# Patient Record
Sex: MALE | Race: BLACK OR AFRICAN AMERICAN | Employment: UNEMPLOYED | URBAN - METROPOLITAN AREA
[De-identification: names, ages, dates, MRNs, and addresses within clinical notes are randomized per-mention and may not be internally consistent; named-entity substitution may affect disease eponyms.]

---

## 2018-04-18 ENCOUNTER — OFFICE VISIT (OUTPATIENT)
Dept: CARDIOLOGY CLINIC | Age: 33
End: 2018-04-18

## 2018-04-18 VITALS
BODY MASS INDEX: 23.4 KG/M2 | WEIGHT: 176.6 LBS | DIASTOLIC BLOOD PRESSURE: 69 MMHG | HEART RATE: 71 BPM | HEIGHT: 73 IN | SYSTOLIC BLOOD PRESSURE: 119 MMHG

## 2018-04-18 DIAGNOSIS — I49.01 VENTRICULAR FIBRILLATION (HCC): Primary | ICD-10-CM

## 2018-04-18 DIAGNOSIS — Z95.810 ICD (IMPLANTABLE CARDIOVERTER-DEFIBRILLATOR) IN PLACE: ICD-10-CM

## 2018-04-18 RX ORDER — SOTALOL HYDROCHLORIDE 80 MG/1
TABLET ORAL
COMMUNITY
End: 2018-05-25 | Stop reason: SDUPTHER

## 2018-04-18 NOTE — MR AVS SNAPSHOT
[address] [facility] 
 
 
 [address] [facility] 
[phone] Patient: Urvashi Woods MRN: [id number] MELINA:[birth year] Visit Information Date & Time Provider Department Dept. Phone Encounter #  
 4/18/2018 10:00 AM Mague Alonzo MD [facility] [phone] [id number] Follow-up Instructions Return in about 6 months (around 10/18/2018). Upcoming Health Maintenance Date Due DTaP/Tdap/Td series (1 - Tdap) 1/4/2006 Influenza Age 5 to Adult 8/1/2017 Allergies as of 4/18/2018  Review Complete On: 4/18/2018 By: Deb Gonzales Not on File Current Immunizations  Never Reviewed No immunizations on file. Not reviewed this visit You Were Diagnosed With   
  
 Codes Comments Ventricular fibrillation (HCC)    -  Primary ICD-10-CM: I49.01 
ICD-9-CM: 427.41   
 ICD (implantable cardioverter-defibrillator) in place     ICD-10-CM: Z95.810 ICD-9-CM: V45.02 Vitals BP Pulse Height(growth percentile) Weight(growth percentile) BMI Smoking Status 119/69 71 6' 1\" (1.854 m) 176 lb 9.6 oz (80.1 kg) 23.3 kg/m2 Former Smoker Vitals History BMI and BSA Data Body Mass Index Body Surface Area  
 23.3 kg/m 2 2.03 m 2 Your Updated Medication List  
  
   
This list is accurate as of 4/18/18 10:36 AM.  Always use your most recent med list.  
  
  
  
  
 sotalol 80 mg tablet Commonly known as:  Cuco Hood Take  by mouth. Take one tablet by mouth every 12 hours Follow-up Instructions Return in about 6 months (around 10/18/2018). Patient Instructions Atrial Fibrillation: Care Instructions Your Care Instructions Atrial fibrillation is an irregular and often fast heartbeat. Treating this condition is important for several reasons. It can cause blood clots, which can travel from your heart to your brain and cause a stroke.  If you have a fast heartbeat, you may feel lightheaded, dizzy, and weak. An irregular heartbeat can also increase your risk for heart failure. Atrial fibrillation is often the result of another heart condition, such as high blood pressure or coronary artery disease. Making changes to improve your heart condition will help you stay healthy and active. Follow-up care is a key part of your treatment and safety. Be sure to make and go to all appointments, and call your doctor if you are having problems. It's also a good idea to know your test results and keep a list of the medicines you take. How can you care for yourself at home? Medicines ? · Take your medicines exactly as prescribed. Call your doctor if you think you are having a problem with your medicine. You will get more details on the specific medicines your doctor prescribes. ? · If your doctor has given you a blood thinner to prevent a stroke, be sure you get instructions about how to take your medicine safely. Blood thinners can cause serious bleeding problems. ? · Do not take any vitamins, over-the-counter drugs, or herbal products without talking to your doctor first. ? Lifestyle changes ? · Do not smoke. Smoking can increase your chance of a stroke and heart attack. If you need help quitting, talk to your doctor about stop-smoking programs and medicines. These can increase your chances of quitting for good. ? · Eat a heart-healthy diet. ? · Stay at a healthy weight. Lose weight if you need to.  
? · Limit alcohol to 2 drinks a day for men and 1 drink a day for women. Too much alcohol can cause health problems. ? · Avoid colds and flu. Get a pneumococcal vaccine shot. If you have had one before, ask your doctor whether you need another dose. Get a flu shot every year. If you must be around people with colds or flu, wash your hands often. Activity ? · If your doctor recommends it, get more exercise.  Walking is a good choice. Bit by bit, increase the amount you walk every day. Try for at least 30 minutes on most days of the week. You also may want to swim, bike, or do other activities. Your doctor may suggest that you join a cardiac rehabilitation program so that you can have help increasing your physical activity safely. ? · Start light exercise if your doctor says it is okay. Even a small amount will help you get stronger, have more energy, and manage stress. Walking is an easy way to get exercise. Start out by walking a little more than you did in the hospital. Gradually increase the amount you walk. ? · When you exercise, watch for signs that your heart is working too hard. You are pushing too hard if you cannot talk while you are exercising. If you become short of breath or dizzy or have chest pain, sit down and rest immediately. ? · Check your pulse regularly. Place two fingers on the artery at the palm side of your wrist, in line with your thumb. If your heartbeat seems uneven or fast, talk to your doctor. When should you call for help? Call 911 anytime you think you may need emergency care. For example, call if: 
? · You have symptoms of a heart attack. These may include: ¨ Chest pain or pressure, or a strange feeling in the chest. 
¨ Sweating. ¨ Shortness of breath. ¨ Nausea or vomiting. ¨ Pain, pressure, or a strange feeling in the back, neck, jaw, or upper belly or in one or both shoulders or arms. ¨ Lightheadedness or sudden weakness. ¨ A fast or irregular heartbeat. After you call 911, the  may tell you to chew 1 adult-strength or 2 to 4 low-dose aspirin. Wait for an ambulance. Do not try to drive yourself. ? · You have symptoms of a stroke. These may include: 
¨ Sudden numbness, tingling, weakness, or loss of movement in your face, arm, or leg, especially on only one side of your body. ¨ Sudden vision changes. ¨ Sudden trouble speaking. ¨ Sudden confusion or trouble understanding simple statements. ¨ Sudden problems with walking or balance. ¨ A sudden, severe headache that is different from past headaches. ? · You passed out (lost consciousness). ?Call your doctor now or seek immediate medical care if: 
? · You have new or increased shortness of breath. ? · You feel dizzy or lightheaded, or you feel like you may faint. ? · Your heart rate becomes irregular. ? · You can feel your heart flutter in your chest or skip heartbeats. Tell your doctor if these symptoms are new or worse. ? Watch closely for changes in your health, and be sure to contact your doctor if you have any problems. Where can you learn more? Go to http://francisco-arik.info/. Enter U020 in the search box to learn more about \"Atrial Fibrillation: Care Instructions. \" Current as of: September 21, 2016 Content Version: 11.4 © 9251-7536 [employer]. Care instructions adapted under license by [employer] (which disclaims liability or warranty for this information). If you have questions about a medical condition or this instruction, always ask your healthcare professional. [place] [address] any warranty or liability for your use of this information. Introducing [facility]! [employer] introduces Polyplex patient portal. Now you can access parts of your medical record, email your doctor's office, and request medication refills online. 1. In your internet browser, go to [email or web address] 2. Click on the First Time User? Click Here link in the Sign In box. You will see the New Member Sign Up page. 3. Enter your Polyplex Access Code exactly as it appears below. You will not need to use this code after youve completed the sign-up process. If you do not sign up before the expiration date, you must request a new code. · Polyplex Access Code: [email or web address] Expires: 7/17/2018  9:41 AM 
 
4. Enter the last four digits of your Social Security Number (xxxx) and Date of Birth (mm/dd/yyyy) as indicated and click Submit. You will be taken to the next sign-up page. 5. Create a INBEP ID. This will be your INBEP login ID and cannot be changed, so think of one that is secure and easy to remember. 6. Create a INBEP password. You can change your password at any time. 7. Enter your Password Reset Question and Answer. This can be used at a later time if you forget your password. 8. Enter your e-mail address. You will receive e-mail notification when new information is available in [address] 9. Click Sign Up. You can now view and download portions of your medical record. 10. Click the Download Summary menu link to download a portable copy of your medical information. If you have questions, please visit the Frequently Asked Questions section of the INBEP website. Remember, INBEP is NOT to be used for urgent needs. For medical emergencies, dial 911. Now available from your iPhone and Android! Please provide this summary of care documentation to your next provider. If you have any questions after today's visit, please call [phone].

## 2018-04-18 NOTE — PATIENT INSTRUCTIONS
Atrial Fibrillation: Care Instructions  Your Care Instructions    Atrial fibrillation is an irregular and often fast heartbeat. Treating this condition is important for several reasons. It can cause blood clots, which can travel from your heart to your brain and cause a stroke. If you have a fast heartbeat, you may feel lightheaded, dizzy, and weak. An irregular heartbeat can also increase your risk for heart failure. Atrial fibrillation is often the result of another heart condition, such as high blood pressure or coronary artery disease. Making changes to improve your heart condition will help you stay healthy and active. Follow-up care is a key part of your treatment and safety. Be sure to make and go to all appointments, and call your doctor if you are having problems. It's also a good idea to know your test results and keep a list of the medicines you take. How can you care for yourself at home? Medicines  ? · Take your medicines exactly as prescribed. Call your doctor if you think you are having a problem with your medicine. You will get more details on the specific medicines your doctor prescribes. ? · If your doctor has given you a blood thinner to prevent a stroke, be sure you get instructions about how to take your medicine safely. Blood thinners can cause serious bleeding problems. ? · Do not take any vitamins, over-the-counter drugs, or herbal products without talking to your doctor first.   ? Lifestyle changes  ? · Do not smoke. Smoking can increase your chance of a stroke and heart attack. If you need help quitting, talk to your doctor about stop-smoking programs and medicines. These can increase your chances of quitting for good. ? · Eat a heart-healthy diet. ? · Stay at a healthy weight. Lose weight if you need to.   ? · Limit alcohol to 2 drinks a day for men and 1 drink a day for women. Too much alcohol can cause health problems. ? · Avoid colds and flu.  Get a pneumococcal vaccine shot. If you have had one before, ask your doctor whether you need another dose. Get a flu shot every year. If you must be around people with colds or flu, wash your hands often. Activity  ? · If your doctor recommends it, get more exercise. Walking is a good choice. Bit by bit, increase the amount you walk every day. Try for at least 30 minutes on most days of the week. You also may want to swim, bike, or do other activities. Your doctor may suggest that you join a cardiac rehabilitation program so that you can have help increasing your physical activity safely. ? · Start light exercise if your doctor says it is okay. Even a small amount will help you get stronger, have more energy, and manage stress. Walking is an easy way to get exercise. Start out by walking a little more than you did in the hospital. Gradually increase the amount you walk. ? · When you exercise, watch for signs that your heart is working too hard. You are pushing too hard if you cannot talk while you are exercising. If you become short of breath or dizzy or have chest pain, sit down and rest immediately. ? · Check your pulse regularly. Place two fingers on the artery at the palm side of your wrist, in line with your thumb. If your heartbeat seems uneven or fast, talk to your doctor. When should you call for help? Call 911 anytime you think you may need emergency care. For example, call if:  ? · You have symptoms of a heart attack. These may include:  ¨ Chest pain or pressure, or a strange feeling in the chest.  ¨ Sweating. ¨ Shortness of breath. ¨ Nausea or vomiting. ¨ Pain, pressure, or a strange feeling in the back, neck, jaw, or upper belly or in one or both shoulders or arms. ¨ Lightheadedness or sudden weakness. ¨ A fast or irregular heartbeat. After you call 911, the  may tell you to chew 1 adult-strength or 2 to 4 low-dose aspirin. Wait for an ambulance. Do not try to drive yourself.    ? · You have symptoms of a stroke. These may include:  ¨ Sudden numbness, tingling, weakness, or loss of movement in your face, arm, or leg, especially on only one side of your body. ¨ Sudden vision changes. ¨ Sudden trouble speaking. ¨ Sudden confusion or trouble understanding simple statements. ¨ Sudden problems with walking or balance. ¨ A sudden, severe headache that is different from past headaches. ? · You passed out (lost consciousness). ?Call your doctor now or seek immediate medical care if:  ? · You have new or increased shortness of breath. ? · You feel dizzy or lightheaded, or you feel like you may faint. ? · Your heart rate becomes irregular. ? · You can feel your heart flutter in your chest or skip heartbeats. Tell your doctor if these symptoms are new or worse. ? Watch closely for changes in your health, and be sure to contact your doctor if you have any problems. Where can you learn more? Go to http://francisco-arik.info/. Enter U020 in the search box to learn more about \"Atrial Fibrillation: Care Instructions. \"  Current as of: September 21, 2016  Content Version: 11.4  © 4607-1002 [employer]. Care instructions adapted under license by [employer] (which disclaims liability or warranty for this information). If you have questions about a medical condition or this instruction, always ask your healthcare professional. [address] any warranty or liability for your use of this information.

## 2018-04-18 NOTE — PROGRESS NOTES
HISTORY OF PRESENT ILLNESS  Ryan Nuno is a 35 y.o. male. New Patient   The history is provided by the patient. This is a chronic problem. The problem occurs every several days. The problem has not changed since onset. Pertinent negatives include no chest pain, no abdominal pain, no headaches and no shortness of breath. Dizziness   The history is provided by the patient. This is a chronic problem. The problem occurs every several days. The problem has not changed since onset. Pertinent negatives include no chest pain, no abdominal pain, no headaches and no shortness of breath. The symptoms are aggravated by standing. Review of Systems   Constitutional: Negative for chills, diaphoresis, fever, malaise/fatigue and weight loss. HENT: Negative for congestion, ear discharge, ear pain, hearing loss, nosebleeds and tinnitus. Eyes: Negative for blurred vision. Respiratory: Negative for cough, hemoptysis, sputum production, shortness of breath, wheezing and stridor. Cardiovascular: Negative for chest pain, palpitations, orthopnea, claudication, leg swelling and PND. Gastrointestinal: Negative for abdominal pain, heartburn, nausea and vomiting. Musculoskeletal: Negative for myalgias and neck pain. Skin: Negative for itching and rash. Neurological: Positive for dizziness. Negative for tingling, tremors, focal weakness, loss of consciousness, weakness and headaches. Psychiatric/Behavioral: Negative for depression and suicidal ideas.      Family History   Problem Relation Age of Onset    Heart Surgery Mother        Past Medical History:   Diagnosis Date    ICD (implantable cardioverter-defibrillator) in place        Past Surgical History:   Procedure Laterality Date    HX PACEMAKER         Social History   Substance Use Topics    Smoking status: Former Smoker     Types: Cigarettes     Quit date: 2/18/2018    Smokeless tobacco: Never Used    Alcohol use No       Not on File    Outpatient Prescriptions Marked as Taking for the 4/18/18 encounter (Office Visit) with Mague Alonzo MD   Medication Sig Dispense Refill    sotalol (BETAPACE) 80 mg tablet Take  by mouth. Take one tablet by mouth every 12 hours          Visit Vitals    /69    Pulse 71    Ht 6' 1\" (1.854 m)    Wt 80.1 kg (176 lb 9.6 oz)    BMI 23.3 kg/m2     Physical Exam   Constitutional: He is oriented to person, place, and time. He appears well-developed and well-nourished. No distress. HENT:   Head: Atraumatic. Mouth/Throat: No oropharyngeal exudate. Eyes: Conjunctivae are normal. Right eye exhibits no discharge. Left eye exhibits no discharge. No scleral icterus. Neck: Normal range of motion. Neck supple. No JVD present. No tracheal deviation present. No thyromegaly present. Cardiovascular: Normal rate and regular rhythm. Exam reveals no gallop. No murmur heard. Pulmonary/Chest: Effort normal and breath sounds normal. No stridor. He has no wheezes. He has no rales. ICD in the left chest wall   Abdominal: Soft. There is no tenderness. There is no rebound and no guarding. Musculoskeletal: Normal range of motion. He exhibits no edema or tenderness. Lymphadenopathy:     He has no cervical adenopathy. Neurological: He is alert and oriented to person, place, and time. He exhibits normal muscle tone. Skin: Skin is warm. He is not diaphoretic. Psychiatric: He has a normal mood and affect. His behavior is normal.     ekg sinus rhythm with no acute st-t changes ( has mild RBBB pattern ? ??? brugada )  Echo 2016:  NORMAL LEFT VENTRICULAR CAVITY SIZE AND SYSTOLIC FUNCTION WITH AN EJECTION FRACTION OF 65%. NORMAL DIASTOLIC FILLING PATTERN. MILDLY DILATED RIGHT VENTRICULAR SIZE. MILD MITRAL REGURGITATION. NO EVIDENCE OF AORTIC REGURGITATION. TRACE OF TRICUSPID REGURGITATION. NORMAL PULMONARY ARTERY PRESSURE OF 17 MMHG. NO EVIDENCE OF PULMONIC REGURGITATION.    NO PREVIOUS REPORT FOR COMPARISON. ASSESSMENT and PLAN    ICD-10-CM ICD-9-CM    1. Ventricular fibrillation (HCC) I49.01 427.41    2. ICD (implantable cardioverter-defibrillator) in place Z95.810 V45.02      No orders of the defined types were placed in this encounter. Follow-up Disposition:  Return in about 6 months (around 10/18/2018). current treatment plan is effective, no change in therapy. Patient with h/o Vfib s/p ICD -- had one episode of Vtach following implant-- was initially on amiodarone which was changed to sotalol 80mg bid. He unable to tolerate additional BB. No ICD shock since the last visit. Has occasional dizziness with no palpitations. Advised compliance with meds. Follow up in 6 months.

## 2018-05-25 DIAGNOSIS — I49.01 VENTRICULAR FIBRILLATION (HCC): Primary | ICD-10-CM

## 2018-05-25 RX ORDER — SOTALOL HYDROCHLORIDE 80 MG/1
80 TABLET ORAL 2 TIMES DAILY
Qty: 60 TAB | Refills: 3 | Status: SHIPPED | OUTPATIENT
Start: 2018-05-25 | End: 2018-11-28 | Stop reason: SDUPTHER

## 2018-05-25 NOTE — TELEPHONE ENCOUNTER
Requested Prescriptions     Pending Prescriptions Disp Refills    sotalol (BETAPACE) 80 mg tablet       Sig: Take  by mouth.  Take one tablet by mouth every 12 hours

## 2018-08-02 ENCOUNTER — OFFICE VISIT (OUTPATIENT)
Dept: CARDIOLOGY CLINIC | Age: 33
End: 2018-08-02

## 2018-08-02 VITALS
BODY MASS INDEX: 24.34 KG/M2 | DIASTOLIC BLOOD PRESSURE: 73 MMHG | SYSTOLIC BLOOD PRESSURE: 122 MMHG | WEIGHT: 170 LBS | HEIGHT: 70 IN | HEART RATE: 70 BPM

## 2018-08-02 DIAGNOSIS — R07.9 CHEST PAIN, UNSPECIFIED TYPE: ICD-10-CM

## 2018-08-02 DIAGNOSIS — R00.2 PALPITATION: ICD-10-CM

## 2018-08-02 DIAGNOSIS — Z95.810 ICD (IMPLANTABLE CARDIOVERTER-DEFIBRILLATOR) IN PLACE: ICD-10-CM

## 2018-08-02 DIAGNOSIS — I49.01 VENTRICULAR FIBRILLATION (HCC): Primary | ICD-10-CM

## 2018-08-02 RX ORDER — FAMOTIDINE 20 MG/1
20 TABLET, FILM COATED ORAL DAILY
COMMUNITY
End: 2019-07-17 | Stop reason: SDUPTHER

## 2018-08-02 NOTE — PROGRESS NOTES
HISTORY OF PRESENT ILLNESS Yo Amador is a 35 y.o. male. Chest Pain (Angina) Associated symptoms include dizziness and palpitations. Pertinent negatives include no abdominal pain, no claudication, no cough, no diaphoresis, no fever, no headaches, no hemoptysis, no malaise/fatigue, no nausea, no orthopnea, no PND, no shortness of breath, no sputum production, no vomiting and no weakness. Palpitations Associated symptoms include chest pain and dizziness. Pertinent negatives include no diaphoresis, no fever, no malaise/fatigue, no claudication, no orthopnea, no PND, no abdominal pain, no nausea, no vomiting, no headaches, no weakness, no cough, no hemoptysis, no shortness of breath and no sputum production. New Patient The history is provided by the patient. This is a chronic problem. The problem occurs every several days. The problem has not changed since onset. Associated symptoms include chest pain. Pertinent negatives include no abdominal pain, no headaches and no shortness of breath. Dizziness The history is provided by the patient. This is a chronic problem. The problem occurs every several days. The problem has not changed since onset. Associated symptoms include chest pain. Pertinent negatives include no abdominal pain, no headaches and no shortness of breath. The symptoms are aggravated by standing. Review of Systems Constitutional: Negative for chills, diaphoresis, fever, malaise/fatigue and weight loss. HENT: Negative for congestion, ear discharge, ear pain, hearing loss, nosebleeds and tinnitus. Eyes: Negative for blurred vision. Respiratory: Negative for cough, hemoptysis, sputum production, shortness of breath, wheezing and stridor. Cardiovascular: Positive for chest pain and palpitations. Negative for orthopnea, claudication, leg swelling and PND. Gastrointestinal: Negative for abdominal pain, heartburn, nausea and vomiting.   
Musculoskeletal: Negative for myalgias and neck pain. Skin: Negative for itching and rash. Neurological: Positive for dizziness. Negative for tingling, tremors, focal weakness, loss of consciousness, weakness and headaches. Psychiatric/Behavioral: Negative for depression and suicidal ideas. Family History Problem Relation Age of Onset  Heart Surgery Mother Past Medical History:  
Diagnosis Date  Chest pain 8/2/2018  
 atypical muscular or gerd  ICD (implantable cardioverter-defibrillator) in place  Palpitation 8/2/2018  
 recent compliant to sotalol qtc normal on er ekg Past Surgical History:  
Procedure Laterality Date  HX PACEMAKER Social History Substance Use Topics  Smoking status: Former Smoker Types: Cigarettes Quit date: 2/18/2018  Smokeless tobacco: Never Used  Alcohol use No  
 
 
Not on File Outpatient Prescriptions Marked as Taking for the 8/2/18 encounter (Office Visit) with Maurisio Patino MD  
Medication Sig Dispense Refill  famotidine (PEPCID) 20 mg tablet Take 20 mg by mouth daily.  sotalol (BETAPACE) 80 mg tablet Take 1 Tab by mouth two (2) times a day. Take one tablet by mouth every 12 hours 60 Tab 3 Visit Vitals  /73  Pulse 70  
 Ht 5' 10\" (1.778 m)  Wt 77.1 kg (170 lb)  BMI 24.39 kg/m2 Physical Exam  
Constitutional: He is oriented to person, place, and time. He appears well-developed and well-nourished. No distress. HENT:  
Head: Atraumatic. Mouth/Throat: No oropharyngeal exudate. Eyes: Conjunctivae are normal. Right eye exhibits no discharge. Left eye exhibits no discharge. No scleral icterus. Neck: Normal range of motion. Neck supple. No JVD present. No tracheal deviation present. No thyromegaly present. Cardiovascular: Normal rate and regular rhythm. Exam reveals no gallop. No murmur heard. Pulmonary/Chest: Effort normal and breath sounds normal. No stridor. He has no wheezes. He has no rales.   
ICD in the left chest wall Abdominal: Soft. There is no tenderness. There is no rebound and no guarding. Musculoskeletal: Normal range of motion. He exhibits no edema or tenderness. Lymphadenopathy:  
  He has no cervical adenopathy. Neurological: He is alert and oriented to person, place, and time. He exhibits normal muscle tone. Skin: Skin is warm. He is not diaphoretic. Psychiatric: He has a normal mood and affect. His behavior is normal.  
 
ekg sinus rhythm with no acute st-t changes ( has mild RBBB pattern ? ??? brugada ) Echo 2016: 
NORMAL LEFT VENTRICULAR CAVITY SIZE AND SYSTOLIC FUNCTION WITH AN EJECTION FRACTION OF 65%. NORMAL DIASTOLIC FILLING PATTERN. MILDLY DILATED RIGHT VENTRICULAR SIZE. MILD MITRAL REGURGITATION. NO EVIDENCE OF AORTIC REGURGITATION. TRACE OF TRICUSPID REGURGITATION. NORMAL PULMONARY ARTERY PRESSURE OF 17 MMHG. NO EVIDENCE OF PULMONIC REGURGITATION. NO PREVIOUS REPORT FOR COMPARISON. I have personally reviewed patient's records available from hospital and other providers and incorporated findings in patient care. Note,lab,ekg I Have personally reviewed recent relevant labs available and discussed with patient ASSESSMENT and PLAN 
  ICD-10-CM ICD-9-CM 1. Ventricular fibrillation ([address] I49.01 427.41   
 2016-no recent recurrence 2. ICD (implantable cardioverter-defibrillator) in place Z95.810 V45.02   
 scd 
needs check 3. Chest pain, unspecified type R07.9 786.50   
 atypical 
muscular or gerd 4. Palpitation R00.2 785.1   
 recent 
compliant to sotalol 
qtc normal on er ekg No orders of the defined types were placed in this encounter. Follow-up Disposition: 
Return in about 3 months (around 11/2/2018) for follow up with Dr Mar Wilcox, [address] [address] setup/transmission. current treatment plan is effective, no change in therapy.  
 
Patient with h/o Vfib s/p ICD -- had one episode of Vtach following implant-- was initially on amiodarone which was changed to sotalol 80mg bid. He unable to tolerate additional BB. No ICD shock since the last visit. Has occasional dizziness with no palpitations. Advised compliance with meds. Follow up in 6 months. 
8/2018 Emergency room visit yesterday. Records reviewed. Palpitation and noncardiac chest pain. Will interrogate ICD lab work was unremarkable. Compliant with sotalol.

## 2018-08-02 NOTE — LETTER
NOTIFICATION OF RETURN TO WORK / SCHOOL 
 
8/2/2018 12:27 PM 
 
Mr. Kassandra Lainez [address] [place] To Whom It May Concern: 
 
Kassandra Lainez was under the care of [address] from 8/2/18 to 8/2/18. He will be able to return to work/school on 8/3/18 with no restrictions. If there are questions or concerns please have the patient contact our office. Sincerely, Signed By: Vivek Gregory. Jenna Saldana MD  
 August 2, 2018 Odalys Ovalles MD

## 2018-08-02 NOTE — PROGRESS NOTES
1. Have you been to the ER, urgent care clinic since your last visit? Hospitalized since your last visit? Yes ortega 2. Have you seen or consulted any other health care providers outside of the [address] [place] [address] since your last visit? Include any pap smears or colon screening. No  
 
3. Since your last visit, have you had any of the following symptoms? chest pains and palpitations.

## 2018-08-02 NOTE — MR AVS SNAPSHOT
[facility] 
 
 
 [address] [facility] 
[phone] Patient: Khari Amaya MRN: [id number] PAQ:[id number] Visit Information Date & Time Provider Department Dept. Phone Encounter #  
 8/2/2018 12:00 PM Joby Malcolm MD [facility]  Follow-up Instructions Return in about 3 months (around 11/2/2018) for follow up with Dr Muna Dennis, [address] [address] setup/transmission. Your Appointments 10/17/2018 10:15 AM  
PROCEDURE with Charles Woo MD  
[facility] ([facility]) Appt Note: 6 month follow up with pacer check/REscour [address] [facility] [address]  
  
   
 [address] [facility]  
  
    
 11/7/2018 12:00 PM  
ESTABLISHED PATIENT with Charles Woo MD  
[facility] ([address] [place] [address]) Appt Note: 3 month [address] [place] [address] [address]  
  
   
 [address] [address] [address] [address] Upcoming Health Maintenance Date Due DTaP/Tdap/Td series (1 - Tdap) 1/4/2006 Influenza Age 5 to Adult 8/1/2018 Allergies as of 8/2/2018  Review Complete On: 8/2/2018 By: Joby Malcolm MD  
 Not on File Current Immunizations  Never Reviewed No immunizations on file. Not reviewed this visit You Were Diagnosed With   
  
 Codes Comments Ventricular fibrillation ([facility] [address]    -  Primary ICD-10-CM: I49.01 
ICD-9-CM: 427.41 2016-no recent recurrence ICD (implantable cardioverter-defibrillator) in place     ICD-10-CM: Z95.810 ICD-9-CM: V45.02 scd 
needs check Chest pain, unspecified type     ICD-10-CM: R07.9 ICD-9-CM: 786.50 atypical 
muscular or gerd Palpitation     ICD-10-CM: R00.2 ICD-9-CM: 785.1 recent 
compliant to sotalol 
qtc normal on er ekg Vitals  BP Pulse Height(growth percentile) Weight(growth percentile) BMI Smoking Status 122/73 70 5' 10\" (1.778 m) 170 lb (77.1 kg) 24.39 kg/m2 Former Smoker BMI and BSA Data Body Mass Index Body Surface Area  
 24.39 kg/m 2 1.95 m 2 Preferred Pharmacy Pharmacy Name Phone [address], [address] [facility] [address] Your Updated Medication List  
  
   
This list is accurate as of 8/2/18 12:29 PM.  Always use your most recent med list.  
  
  
  
  
 PEPCID 20 mg tablet Generic drug:  famotidine Take 20 mg by mouth daily. sotalol 80 mg tablet Commonly known as:  Britta Lora Take 1 Tab by mouth two (2) times a day. Take one tablet by mouth every 12 hours Follow-up Instructions Return in about 3 months (around 11/2/2018) for follow up with Dr Dee Gerber, [address] [address] setup/transmission. Introducing [facility]! [employer] introduces [employer] patient portal. Now you can access parts of your medical record, email your doctor's office, and request medication refills online. 1. In your internet browser, go to [email or web address] 2. Click on the First Time User? Click Here link in the Sign In box. You will see the New Member Sign Up page. 3. Enter your [employer] Access Code exactly as it appears below. You will not need to use this code after youve completed the sign-up process. If you do not sign up before the expiration date, you must request a new code. · [employer] Access Code: [email or web address] Expires: 10/31/2018 12:29 PM 
 
4. Enter the last four digits of your Social Security Number (xxxx) and Date of Birth (mm/dd/yyyy) as indicated and click Submit. You will be taken to the next sign-up page. 5. Create a [employer] ID. This will be your [employer] login ID and cannot be changed, so think of one that is secure and easy to remember. 6. Create a [employer] password. You can change your password at any time. 7. Enter your Password Reset Question and Answer.  This can be used at a later time if you forget your password. 8. Enter your e-mail address. You will receive e-mail notification when new information is available in [address] 9. Click Sign Up. You can now view and download portions of your medical record. 10. Click the Download Summary menu link to download a portable copy of your medical information. If you have questions, please visit the Frequently Asked Questions section of the [employer] website. Remember, [employer] is NOT to be used for urgent needs. For medical emergencies, dial 911. Now available from your iPhone and Android! Please provide this summary of care documentation to your next provider. If you have any questions after today's visit, please call [phone].

## 2018-08-30 ENCOUNTER — OFFICE VISIT (OUTPATIENT)
Dept: CARDIOLOGY CLINIC | Age: 33
End: 2018-08-30

## 2018-08-30 VITALS
WEIGHT: 168 LBS | BODY MASS INDEX: 24.05 KG/M2 | DIASTOLIC BLOOD PRESSURE: 81 MMHG | HEIGHT: 70 IN | HEART RATE: 107 BPM | SYSTOLIC BLOOD PRESSURE: 141 MMHG

## 2018-08-30 DIAGNOSIS — Z95.810 ICD (IMPLANTABLE CARDIOVERTER-DEFIBRILLATOR) IN PLACE: ICD-10-CM

## 2018-08-30 DIAGNOSIS — R42 DIZZINESS: Primary | ICD-10-CM

## 2018-08-30 DIAGNOSIS — I49.01 VENTRICULAR FIBRILLATION (HCC): ICD-10-CM

## 2018-08-30 NOTE — PATIENT INSTRUCTIONS
Dizziness: Care Instructions Your Care Instructions Dizziness is the feeling of unsteadiness or fuzziness in your head. It is different than having vertigo, which is a feeling that the room is spinning or that you are moving or falling. It is also different from lightheadedness, which is the feeling that you are about to faint. It can be hard to know what causes dizziness. Some people feel dizzy when they have migraine headaches. Sometimes bouts of flu can make you feel dizzy. Some medical conditions, such as heart problems or high blood pressure, can make you feel dizzy. Many medicines can cause dizziness, including medicines for high blood pressure, pain, or anxiety. If a medicine causes your symptoms, your doctor may recommend that you stop or change the medicine. If it is a problem with your heart, you may need medicine to help your heart work better. If there is no clear reason for your symptoms, your doctor may suggest watching and waiting for a while to see if the dizziness goes away on its own. Follow-up care is a key part of your treatment and safety. Be sure to make and go to all appointments, and call your doctor if you are having problems. It's also a good idea to know your test results and keep a list of the medicines you take. How can you care for yourself at home? · If your doctor recommends or prescribes medicine, take it exactly as directed. Call your doctor if you think you are having a problem with your medicine. · Do not drive while you feel dizzy. · Try to prevent falls. Steps you can take include: ¨ Using nonskid mats, adding grab bars near the tub, and using night-lights. ¨ Clearing your home so that walkways are free of anything you might trip on. ¨ Letting family and friends know that you have been feeling dizzy. This will help them know how to help you. When should you call for help? Call 911 anytime you think you may need emergency care. For example, call if:   · You passed out (lost consciousness).  
  · You have dizziness along with symptoms of a heart attack. These may include: ¨ Chest pain or pressure, or a strange feeling in the chest. 
¨ Sweating. ¨ Shortness of breath. ¨ Nausea or vomiting. ¨ Pain, pressure, or a strange feeling in the back, neck, jaw, or upper belly or in one or both shoulders or arms. ¨ Lightheadedness or sudden weakness. ¨ A fast or irregular heartbeat.  
  · You have symptoms of a stroke. These may include: 
¨ Sudden numbness, tingling, weakness, or loss of movement in your face, arm, or leg, especially on only one side of your body. ¨ Sudden vision changes. ¨ Sudden trouble speaking. ¨ Sudden confusion or trouble understanding simple statements. ¨ Sudden problems with walking or balance. ¨ A sudden, severe headache that is different from past headaches.  
 Call your doctor now or seek immediate medical care if: 
  · You feel dizzy and have a fever, headache, or ringing in your ears.  
  · You have new or increased nausea and vomiting.  
  · Your dizziness does not go away or comes back.  
 Watch closely for changes in your health, and be sure to contact your doctor if: 
  · You do not get better as expected. Where can you learn more? Go to http://francisco-arik.info/. Enter W931 in the search box to learn more about \"Dizziness: Care Instructions. \" Current as of: November 20, 2017 Content Version: 11.7 © 1662-9172 [employer]. Care instructions adapted under license by [employer] (which disclaims liability or warranty for this information). If you have questions about a medical condition or this instruction, always ask your healthcare professional. [place] [address] any warranty or liability for your use of this information.

## 2018-08-30 NOTE — LETTER
NOTIFICATION OF RETURN TO WORK / SCHOOL 
 
8/31/2018 11:27 AM 
 
Mr. Gricel Prabhakar [address] [place] To Whom It May Concern: 
 
Gricel  was under the care of [address] from 8/30/18 to 8/30/18. He will be able to return to work/school on 8/31/18 with no restrictions. If there are questions or concerns please have the patient contact our office. Sincerely, Signed By: Arina Hill MD  
 August 31, 2018 Arina Hill MD

## 2018-08-30 NOTE — PROGRESS NOTES
1. Have you been to the ER, urgent care clinic since your last visit? Hospitalized since your last visit? No 
2. Have you seen or consulted any other health care providers outside of the [address] [place] [address] since your last visit? Include any pap smears or colon screening. No  
 
3. Since your last visit, have you had any of the following symptoms?  
 
 dizziness.

## 2018-08-30 NOTE — MR AVS SNAPSHOT
[address] [facility], [place], operated by [facility] 
 
 
 [address] [facility] 
[phone] Patient: Maddy Membreno MRN: [id number] XKN:[id number] Visit Information Date & Time Provider Department Dept. Phone Encounter #  
 8/30/2018  1:00 PM Rip Diaz MD [facility] [id number] Follow-up Instructions Return in about 6 months (around 2/28/2019). Your Appointments 2/20/2019  8:45 AM  
Office Visit with Rip Diaz MD  
[facility] ([facility] Appt Note: 6 month follow up  
 [address] [facility] [address]  
  
   
 [address] [address] [address] [address] Upcoming Health Maintenance Date Due DTaP/Tdap/Td series (1 - Tdap) 1/4/2006 Influenza Age 5 to Adult 8/1/2018 Allergies as of 8/30/2018  Review Complete On: 8/30/2018 By: Lynsey Juarez Not on File Current Immunizations  Never Reviewed No immunizations on file. Not reviewed this visit You Were Diagnosed With   
  
 Codes Comments Dizziness    -  Primary ICD-10-CM: B96 ICD-9-CM: 780.4 Vitals BP Pulse Height(growth percentile) Weight(growth percentile) BMI Smoking Status 141/81 (BP 1 Location: Right arm, BP Patient Position: Standing) (!) 107 5' 10\" (1.778 m) 168 lb (76.2 kg) 24.11 kg/m2 Former Smoker Vitals History BMI and BSA Data Body Mass Index Body Surface Area  
 24.11 kg/m 2 1.94 m 2 Preferred Pharmacy Pharmacy Name Phone [address], [address] Your Updated Medication List  
  
   
This list is accurate as of 8/30/18  2:01 PM.  Always use your most recent med list.  
  
  
  
  
 PEPCID 20 mg tablet Generic drug:  famotidine Take 20 mg by mouth daily. sotalol 80 mg tablet Commonly known as:  Maria C Isaac  
 Take 1 Tab by mouth two (2) times a day. Take one tablet by mouth every 12 hours We Performed the Following AMB POC EKG ROUTINE W/ 12 LEADS, INTER & REP [00536 CPT(R)] Follow-up Instructions Return in about 6 months (around 2/28/2019). Patient Instructions Dizziness: Care Instructions Your Care Instructions Dizziness is the feeling of unsteadiness or fuzziness in your head. It is different than having vertigo, which is a feeling that the room is spinning or that you are moving or falling. It is also different from lightheadedness, which is the feeling that you are about to faint. It can be hard to know what causes dizziness. Some people feel dizzy when they have migraine headaches. Sometimes bouts of flu can make you feel dizzy. Some medical conditions, such as heart problems or high blood pressure, can make you feel dizzy. Many medicines can cause dizziness, including medicines for high blood pressure, pain, or anxiety. If a medicine causes your symptoms, your doctor may recommend that you stop or change the medicine. If it is a problem with your heart, you may need medicine to help your heart work better. If there is no clear reason for your symptoms, your doctor may suggest watching and waiting for a while to see if the dizziness goes away on its own. Follow-up care is a key part of your treatment and safety. Be sure to make and go to all appointments, and call your doctor if you are having problems. It's also a good idea to know your test results and keep a list of the medicines you take. How can you care for yourself at home? · If your doctor recommends or prescribes medicine, take it exactly as directed. Call your doctor if you think you are having a problem with your medicine. · Do not drive while you feel dizzy. · Try to prevent falls. Steps you can take include: ¨ Using nonskid mats, adding grab bars near the tub, and using night-lights. ¨ Clearing your home so that walkways are free of anything you might trip on. ¨ Letting family and friends know that you have been feeling dizzy. This will help them know how to help you. When should you call for help? Call 911 anytime you think you may need emergency care. For example, call if: 
  · You passed out (lost consciousness).  
  · You have dizziness along with symptoms of a heart attack. These may include: ¨ Chest pain or pressure, or a strange feeling in the chest. 
¨ Sweating. ¨ Shortness of breath. ¨ Nausea or vomiting. ¨ Pain, pressure, or a strange feeling in the back, neck, jaw, or upper belly or in one or both shoulders or arms. ¨ Lightheadedness or sudden weakness. ¨ A fast or irregular heartbeat.  
  · You have symptoms of a stroke. These may include: 
¨ Sudden numbness, tingling, weakness, or loss of movement in your face, arm, or leg, especially on only one side of your body. ¨ Sudden vision changes. ¨ Sudden trouble speaking. ¨ Sudden confusion or trouble understanding simple statements. ¨ Sudden problems with walking or balance. ¨ A sudden, severe headache that is different from past headaches.  
 Call your doctor now or seek immediate medical care if: 
  · You feel dizzy and have a fever, headache, or ringing in your ears.  
  · You have new or increased nausea and vomiting.  
  · Your dizziness does not go away or comes back.  
 Watch closely for changes in your health, and be sure to contact your doctor if: 
  · You do not get better as expected. Where can you learn more? Go to http://francisco-arik.info/. Enter F025 in the search box to learn more about \"Dizziness: Care Instructions. \" Current as of: November 20, 2017 Content Version: 11.7 © 7722-5156 [employer].  Care instructions adapted under license by [employer] (which disclaims liability or warranty for this information). If you have questions about a medical condition or this instruction, always ask your healthcare professional. [address] any warranty or liability for your use of this information. Introducing [facility] SERVICES! Pat Keven introduces iyzico patient portal. Now you can access parts of your medical record, email your doctor's office, and request medication refills online. 1. In your internet browser, go to [email or web address] 2. Click on the First Time User? Click Here link in the Sign In box. You will see the New Member Sign Up page. 3. Enter your iyzico Access Code exactly as it appears below. You will not need to use this code after youve completed the sign-up process. If you do not sign up before the expiration date, you must request a new code. · iyzico Access Code: [email or web address] Expires: 10/31/2018 12:29 PM 
 
4. Enter the last four digits of your Social Security Number (xxxx) and Date of Birth (mm/dd/yyyy) as indicated and click Submit. You will be taken to the next sign-up page. 5. Create a iyzico ID. This will be your iyzico login ID and cannot be changed, so think of one that is secure and easy to remember. 6. Create a iyzico password. You can change your password at any time. 7. Enter your Password Reset Question and Answer. This can be used at a later time if you forget your password. 8. Enter your e-mail address. You will receive e-mail notification when new information is available in [address] 9. Click Sign Up. You can now view and download portions of your medical record. 10. Click the Download Summary menu link to download a portable copy of your medical information. If you have questions, please visit the Frequently Asked Questions section of the iyzico website. Remember, iyzico is NOT to be used for urgent needs. For medical emergencies, dial 911. Now available from your iPhone and Android! Please provide this summary of care documentation to your next provider. If you have any questions after today's visit, please call [phone].

## 2018-08-31 NOTE — PROGRESS NOTES
HISTORY OF PRESENT ILLNESS Mark Hartman is a 35 y.o. male. Dizziness The history is provided by the patient. This is a chronic problem. The problem occurs every several days. The problem has not changed since onset. Pertinent negatives include no chest pain and no shortness of breath. The symptoms are aggravated by standing. Review of Systems Constitutional: Negative for chills, diaphoresis, fever, malaise/fatigue and weight loss. HENT: Negative for congestion, ear discharge, ear pain, hearing loss, nosebleeds and tinnitus. Eyes: Negative for blurred vision. Respiratory: Negative for cough, hemoptysis, sputum production, shortness of breath, wheezing and stridor. Cardiovascular: Negative for chest pain, palpitations, orthopnea, claudication, leg swelling and PND. Gastrointestinal: Negative for heartburn, nausea and vomiting. Musculoskeletal: Negative for myalgias and neck pain. Skin: Negative for itching and rash. Neurological: Positive for dizziness. Negative for tingling, tremors, focal weakness, loss of consciousness and weakness. Psychiatric/Behavioral: Negative for depression and suicidal ideas. Family History Problem Relation Age of Onset  Heart Surgery Mother Past Medical History:  
Diagnosis Date  Chest pain 8/2/2018  
 atypical muscular or gerd  ICD (implantable cardioverter-defibrillator) in place  Palpitation 8/2/2018  
 recent compliant to sotalol qtc normal on er ekg Past Surgical History:  
Procedure Laterality Date  HX PACEMAKER Social History Substance Use Topics  Smoking status: Former Smoker Types: Cigarettes Quit date: 2/18/2018  Smokeless tobacco: Never Used  Alcohol use No  
 
 
Not on File Outpatient Prescriptions Marked as Taking for the 8/30/18 encounter (Office Visit) with Pina Glaser MD  
Medication Sig Dispense Refill  famotidine (PEPCID) 20 mg tablet Take 20 mg by mouth daily.  sotalol (BETAPACE) 80 mg tablet Take 1 Tab by mouth two (2) times a day. Take one tablet by mouth every 12 hours 60 Tab 3 Visit Vitals  /81 (BP 1 Location: Right arm, BP Patient Position: Standing)  Pulse (!) 107  Ht 5' 10\" (1.778 m)  Wt 76.2 kg (168 lb)  BMI 24.11 kg/m2 Physical Exam  
Constitutional: He is oriented to person, place, and time. He appears well-developed and well-nourished. No distress. HENT:  
Head: Atraumatic. Mouth/Throat: No oropharyngeal exudate. Eyes: Conjunctivae are normal. Right eye exhibits no discharge. Left eye exhibits no discharge. No scleral icterus. Neck: Normal range of motion. Neck supple. No JVD present. No tracheal deviation present. No thyromegaly present. Cardiovascular: Normal rate and regular rhythm. Exam reveals no gallop. No murmur heard. Pulmonary/Chest: Effort normal and breath sounds normal. No stridor. He has no wheezes. He has no rales. ICD in the left chest wall Abdominal: Soft. There is no tenderness. There is no rebound and no guarding. Musculoskeletal: Normal range of motion. He exhibits no edema or tenderness. Lymphadenopathy:  
  He has no cervical adenopathy. Neurological: He is alert and oriented to person, place, and time. He exhibits normal muscle tone. Skin: Skin is warm. He is not diaphoretic. Psychiatric: He has a normal mood and affect. His behavior is normal.  
 
ekg sinus rhythm with no acute st-t changes ( has mild RBBB pattern ? ??? brugada ) Echo 2016: 
NORMAL LEFT VENTRICULAR CAVITY SIZE AND SYSTOLIC FUNCTION WITH AN EJECTION FRACTION OF 65%. NORMAL DIASTOLIC FILLING PATTERN. MILDLY DILATED RIGHT VENTRICULAR SIZE. MILD MITRAL REGURGITATION. NO EVIDENCE OF AORTIC REGURGITATION. TRACE OF TRICUSPID REGURGITATION. NORMAL PULMONARY ARTERY PRESSURE OF 17 MMHG. NO EVIDENCE OF PULMONIC REGURGITATION. NO PREVIOUS REPORT FOR COMPARISON. ASSESSMENT and PLAN 
  ICD-10-CM ICD-9-CM 1. Dizziness R42 780.4 AMB POC EKG ROUTINE W/ 12 LEADS, INTER & REP 2. Ventricular fibrillation (HCC) I49.01 427.41   
3. ICD (implantable cardioverter-defibrillator) in place Z95.810 V45.02 Orders Placed This Encounter  AMB POC EKG ROUTINE W/ 12 LEADS, INTER & REP Order Specific Question:   Reason for Exam: Answer:   dizziness Follow-up Disposition: 
Return in about 6 months (around 2/28/2019). current treatment plan is effective, no change in therapy. Patient with h/o Vfib s/p ICD -- had one episode of Vtach following implant-- was initially on amiodarone which was changed to sotalol 80mg bid. He unable to tolerate additional BB. No ICD shock since the last visit. Has occasional dizziness with no palpitations. Advised compliance with meds. Follow up in 6 months.

## 2018-11-28 DIAGNOSIS — I49.01 VENTRICULAR FIBRILLATION (HCC): ICD-10-CM

## 2018-11-28 RX ORDER — SOTALOL HYDROCHLORIDE 80 MG/1
80 TABLET ORAL 2 TIMES DAILY
Qty: 60 TAB | Refills: 3 | Status: SHIPPED | OUTPATIENT
Start: 2018-11-28 | End: 2019-07-17 | Stop reason: SDUPTHER

## 2018-11-28 NOTE — TELEPHONE ENCOUNTER
Requested Prescriptions     Pending Prescriptions Disp Refills    sotalol (BETAPACE) 80 mg tablet 60 Tab 3     Sig: Take 1 Tab by mouth two (2) times a day.  Take one tablet by mouth every 12 hours

## 2019-02-20 ENCOUNTER — OFFICE VISIT (OUTPATIENT)
Dept: CARDIOLOGY CLINIC | Age: 34
End: 2019-02-20

## 2019-02-20 VITALS
DIASTOLIC BLOOD PRESSURE: 71 MMHG | HEART RATE: 72 BPM | HEIGHT: 70 IN | BODY MASS INDEX: 25.91 KG/M2 | WEIGHT: 181 LBS | SYSTOLIC BLOOD PRESSURE: 124 MMHG

## 2019-02-20 DIAGNOSIS — I49.01 VENTRICULAR FIBRILLATION (HCC): ICD-10-CM

## 2019-02-20 DIAGNOSIS — Z95.810 ICD (IMPLANTABLE CARDIOVERTER-DEFIBRILLATOR) IN PLACE: Primary | ICD-10-CM

## 2019-02-20 RX ORDER — ALBUTEROL SULFATE 90 UG/1
AEROSOL, METERED RESPIRATORY (INHALATION)
COMMUNITY
End: 2020-09-02 | Stop reason: SDUPTHER

## 2019-02-20 NOTE — PROGRESS NOTES
1. Have you been to the ER, urgent care clinic since your last visit? Hospitalized since your last visit? Yes ortega    2. Have you seen or consulted any other health care providers outside of the [address] [place] [address] since your last visit? Include any pap smears or colon screening. No     3. Since your last visit, have you had any of the following symptoms? shortness of breath.

## 2019-02-20 NOTE — PROGRESS NOTES
HISTORY OF PRESENT ILLNESS  Carlos Tavera is a 29 y.o. male. Dizziness   The history is provided by the patient. This is a chronic problem. The problem occurs rarely. The problem has not changed since onset. Pertinent negatives include no chest pain and no shortness of breath. Review of Systems   Constitutional: Negative for chills, diaphoresis, malaise/fatigue and weight loss. HENT: Negative for congestion, ear discharge, hearing loss, nosebleeds and tinnitus. Eyes: Negative for blurred vision. Respiratory: Negative for shortness of breath and stridor. Cardiovascular: Negative for chest pain and palpitations. Gastrointestinal: Negative for heartburn and nausea. Musculoskeletal: Negative for myalgias. Skin: Negative for itching. Neurological: Positive for dizziness. Negative for tingling, tremors, focal weakness, loss of consciousness and weakness. Psychiatric/Behavioral: Negative for depression and suicidal ideas. Family History   Problem Relation Age of Onset    Heart Surgery Mother        Past Medical History:   Diagnosis Date    Chest pain 2018    atypical muscular or gerd     ICD (implantable cardioverter-defibrillator) in place     Palpitation 2018    recent compliant to sotalol qtc normal on er ekg       Past Surgical History:   Procedure Laterality Date    HX PACEMAKER         Social History     Tobacco Use    Smoking status: Former Smoker     Types: Cigarettes     Last attempt to quit: 2018     Years since quittin.0    Smokeless tobacco: Never Used   Substance Use Topics    Alcohol use: No       Not on File    Outpatient Medications Marked as Taking for the 19 encounter (Office Visit) with Lamont Marsh MD   Medication Sig Dispense Refill    albuterol (PROVENTIL HFA, VENTOLIN HFA, PROAIR HFA) 90 mcg/actuation inhaler Take  by inhalation.  sotalol (BETAPACE) 80 mg tablet Take 1 Tab by mouth two (2) times a day.  60 Tab 3    famotidine (PEPCID) 20 mg tablet Take 20 mg by mouth daily. Visit Vitals  /71   Pulse 72   Ht 5' 10\" (1.778 m)   Wt 82.1 kg (181 lb)   BMI 25.97 kg/m²     Physical Exam   Constitutional: He is oriented to person, place, and time. He appears well-developed and well-nourished. No distress. HENT:   Head: Atraumatic. Mouth/Throat: No oropharyngeal exudate. Eyes: Conjunctivae are normal. Right eye exhibits no discharge. Left eye exhibits no discharge. No scleral icterus. Neck: Normal range of motion. Neck supple. No JVD present. No tracheal deviation present. No thyromegaly present. Cardiovascular: Normal rate and regular rhythm. Exam reveals no gallop. No murmur heard. Pulmonary/Chest: Effort normal and breath sounds normal. No stridor. He has no wheezes. He has no rales. ICD in the left chest wall   Abdominal: Soft. There is no tenderness. There is no rebound and no guarding. Musculoskeletal: Normal range of motion. He exhibits no edema or tenderness. Lymphadenopathy:     He has no cervical adenopathy. Neurological: He is alert and oriented to person, place, and time. He exhibits normal muscle tone. Skin: Skin is warm. He is not diaphoretic. Psychiatric: He has a normal mood and affect. His behavior is normal.     ekg sinus rhythm with no acute st-t changes ( has mild RBBB pattern ? ??? brugada )  Echo 2016:  NORMAL LEFT VENTRICULAR CAVITY SIZE AND SYSTOLIC FUNCTION WITH AN EJECTION FRACTION OF 65%. NORMAL DIASTOLIC FILLING PATTERN. MILDLY DILATED RIGHT VENTRICULAR SIZE. MILD MITRAL REGURGITATION. NO EVIDENCE OF AORTIC REGURGITATION. TRACE OF TRICUSPID REGURGITATION. NORMAL PULMONARY ARTERY PRESSURE OF 17 MMHG. NO EVIDENCE OF PULMONIC REGURGITATION. NO PREVIOUS REPORT FOR COMPARISON. ASSESSMENT and PLAN    ICD-10-CM ICD-9-CM    1. ICD (implantable cardioverter-defibrillator) in place Z95.810 V45.02 AMB POC EKG ROUTINE W/ 12 LEADS, INTER & REP   2.  Ventricular fibrillation ([facility] [address] I49.01 427.41      Orders Placed This Encounter    AMB POC EKG ROUTINE W/ 12 LEADS, INTER & REP     Order Specific Question:   Reason for Exam:     Answer:   shortness of breath     Follow-up Disposition:  Return in about 6 months (around 8/20/2019). current treatment plan is effective, no change in therapy. Patient with h/o Vfib s/p ICD -- had one episode of Vtach following implant-- was initially on amiodarone which was changed to sotalol 80mg bid. He unable to tolerate additional BB. No ICD shock since the last visit. Has occasional dizziness with no palpitations. Advised compliance with meds. ekg today- revealed normal Qtc  Will obtain ICD interrogation  Follow up in 6 months.

## 2019-02-20 NOTE — PATIENT INSTRUCTIONS
[email or web address].me Activation    Thank you for requesting access to [email or web address]. Please follow the instructions below to securely access and download your online medical record. [email or web address] allows you to send messages to your doctor, view your test results, renew your prescriptions, schedule appointments, and more. How Do I Sign Up? 1. In your internet browser, go to [email or web address]. 2. Click on the First Time User? Click Here link in the Sign In box. You will see the New Member Sign Up page. 3. Enter your [email or web address].me Access Code exactly as it appears below. You will not need to use this code after youve completed the sign-up process. If you do not sign up before the expiration date, you must request a new code. [email or web address].me Access Code: [email or web address]  Expires: 2019  9:10 AM (This is the date your Fits.me access code will )    4. Enter the last four digits of your Social Security Number (xxxx) and Date of Birth (mm/dd/yyyy) as indicated and click Submit. You will be taken to the next sign-up page. 5. Create a Fits.me ID. This will be your Fits.me login ID and cannot be changed, so think of one that is secure and easy to remember. 6. Create a Fits.me password. You can change your password at any time. 7. Enter your Password Reset Question and Answer. This can be used at a later time if you forget your password. 8. Enter your e-mail address. You will receive e-mail notification when new information is available in [address]. 9. Click Sign Up. You can now view and download portions of your medical record. 10. Click the Download Summary menu link to download a portable copy of your medical information. Additional Information    If you have questions, please visit the Frequently Asked Questions section of the [email or web address] website at [email or web address] Remember, [email or web address] is NOT to be used for urgent needs. For medical emergencies, dial 911.

## 2019-07-17 ENCOUNTER — OFFICE VISIT (OUTPATIENT)
Dept: CARDIOLOGY CLINIC | Age: 34
End: 2019-07-17

## 2019-07-17 VITALS
BODY MASS INDEX: 25.97 KG/M2 | HEIGHT: 70 IN | DIASTOLIC BLOOD PRESSURE: 86 MMHG | HEART RATE: 80 BPM | SYSTOLIC BLOOD PRESSURE: 131 MMHG

## 2019-07-17 DIAGNOSIS — I49.01 VENTRICULAR FIBRILLATION (HCC): ICD-10-CM

## 2019-07-17 DIAGNOSIS — Z95.810 ICD (IMPLANTABLE CARDIOVERTER-DEFIBRILLATOR) IN PLACE: Primary | ICD-10-CM

## 2019-07-17 DIAGNOSIS — R42 DIZZINESS: ICD-10-CM

## 2019-07-17 RX ORDER — MECLIZINE HCL 12.5 MG 12.5 MG/1
12.5 TABLET ORAL
Qty: 20 TAB | Refills: 0 | Status: SHIPPED | OUTPATIENT
Start: 2019-07-17 | End: 2019-07-27

## 2019-07-17 RX ORDER — FAMOTIDINE 20 MG/1
20 TABLET, FILM COATED ORAL DAILY
Qty: 30 TAB | Refills: 6 | Status: SHIPPED | OUTPATIENT
Start: 2019-07-17 | End: 2020-09-02 | Stop reason: SDUPTHER

## 2019-07-17 RX ORDER — SOTALOL HYDROCHLORIDE 80 MG/1
80 TABLET ORAL 2 TIMES DAILY
Qty: 60 TAB | Refills: 6 | Status: SHIPPED | OUTPATIENT
Start: 2019-07-17 | End: 2019-08-14 | Stop reason: SDUPTHER

## 2019-07-17 NOTE — PATIENT INSTRUCTIONS
Dizziness: Care Instructions  Your Care Instructions  Dizziness is the feeling of unsteadiness or fuzziness in your head. It is different than having vertigo, which is a feeling that the room is spinning or that you are moving or falling. It is also different from lightheadedness, which is the feeling that you are about to faint. It can be hard to know what causes dizziness. Some people feel dizzy when they have migraine headaches. Sometimes bouts of flu can make you feel dizzy. Some medical conditions, such as heart problems or high blood pressure, can make you feel dizzy. Many medicines can cause dizziness, including medicines for high blood pressure, pain, or anxiety. If a medicine causes your symptoms, your doctor may recommend that you stop or change the medicine. If it is a problem with your heart, you may need medicine to help your heart work better. If there is no clear reason for your symptoms, your doctor may suggest watching and waiting for a while to see if the dizziness goes away on its own. Follow-up care is a key part of your treatment and safety. Be sure to make and go to all appointments, and call your doctor if you are having problems. It's also a good idea to know your test results and keep a list of the medicines you take. How can you care for yourself at home? · If your doctor recommends or prescribes medicine, take it exactly as directed. Call your doctor if you think you are having a problem with your medicine. · Do not drive while you feel dizzy. · Try to prevent falls. Steps you can take include:  ? Using nonskid mats, adding grab bars near the tub, and using night-lights. ? Clearing your home so that walkways are free of anything you might trip on.  ? Letting family and friends know that you have been feeling dizzy. This will help them know how to help you. When should you call for help? Call 911 anytime you think you may need emergency care.  For example, call if:    · You passed out (lost consciousness).     · You have dizziness along with symptoms of a heart attack. These may include:  ? Chest pain or pressure, or a strange feeling in the chest.  ? Sweating. ? Shortness of breath. ? Nausea or vomiting. ? Pain, pressure, or a strange feeling in the back, neck, jaw, or upper belly or in one or both shoulders or arms. ? Lightheadedness or sudden weakness. ? A fast or irregular heartbeat.     · You have symptoms of a stroke. These may include:  ? Sudden numbness, tingling, weakness, or loss of movement in your face, arm, or leg, especially on only one side of your body. ? Sudden vision changes. ? Sudden trouble speaking. ? Sudden confusion or trouble understanding simple statements. ? Sudden problems with walking or balance. ? A sudden, severe headache that is different from past headaches.    Call your doctor now or seek immediate medical care if:    · You feel dizzy and have a fever, headache, or ringing in your ears.     · You have new or increased nausea and vomiting.     · Your dizziness does not go away or comes back.    Watch closely for changes in your health, and be sure to contact your doctor if:    · You do not get better as expected. Where can you learn more? Go to http://francisco-arik.info/. Enter C817 in the search box to learn more about \"Dizziness: Care Instructions. \"  Current as of: September 23, 2018  Content Version: 11.9  © 8627-6574 [employer]. Care instructions adapted under license by [employer] (which disclaims liability or warranty for this information). If you have questions about a medical condition or this instruction, always ask your healthcare professional. [place] [address] any warranty or liability for your use of this information. MyChart Activation    Thank you for requesting access to [employer].  Please follow the instructions below to securely access and download your online medical record. [employer] allows you to send messages to your doctor, view your test results, renew your prescriptions, schedule appointments, and more. How Do I Sign Up? 1. In your internet browser, go to [email or web address]. 2. Click on the First Time User? Click Here link in the Sign In box. You will see the New Member Sign Up page. 3. Enter your [employer] Access Code exactly as it appears below. You will not need to use this code after youve completed the sign-up process. If you do not sign up before the expiration date, you must request a new code. [employer] Access Code: [email or web address]  Expires: 2019  1:42 PM (This is the date your [employer] access code will )    4. Enter the last four digits of your Social Security Number (xxxx) and Date of Birth (mm/dd/yyyy) as indicated and click Submit. You will be taken to the next sign-up page. 5. Create a [employer] ID. This will be your [employer] login ID and cannot be changed, so think of one that is secure and easy to remember. 6. Create a [employer] password. You can change your password at any time. 7. Enter your Password Reset Question and Answer. This can be used at a later time if you forget your password. 8. Enter your e-mail address. You will receive e-mail notification when new information is available in [address] 9. Click Sign Up. You can now view and download portions of your medical record. 10. Click the Download Summary menu link to download a portable copy of your medical information. Additional Information    If you have questions, please visit the Frequently Asked Questions section of the [employer] website at [email or web address] Remember, [employer] is NOT to be used for urgent needs. For medical emergencies, dial 911.

## 2019-07-17 NOTE — PROGRESS NOTES
HISTORY OF PRESENT ILLNESS  Vignesh Paulino is a 29 y.o. male. Dizziness   The history is provided by the patient. This is a chronic problem. The problem occurs rarely. The problem has not changed since onset. Pertinent negatives include no shortness of breath. Review of Systems   Constitutional: Negative for chills, diaphoresis, malaise/fatigue and weight loss. HENT: Negative for congestion, ear discharge, hearing loss, nosebleeds and tinnitus. Eyes: Negative for blurred vision. Respiratory: Negative for shortness of breath and stridor. Cardiovascular: Negative for palpitations. Gastrointestinal: Negative for heartburn and nausea. Musculoskeletal: Negative for myalgias. Skin: Negative for itching. Neurological: Positive for dizziness. Negative for tingling, tremors, focal weakness, loss of consciousness and weakness. Psychiatric/Behavioral: Negative for depression and suicidal ideas. Family History   Problem Relation Age of Onset    Heart Surgery Mother        Past Medical History:   Diagnosis Date    Chest pain 2018    atypical muscular or gerd     ICD (implantable cardioverter-defibrillator) in place     Palpitation 2018    recent compliant to sotalol qtc normal on er ekg       Past Surgical History:   Procedure Laterality Date    HX PACEMAKER         Social History     Tobacco Use    Smoking status: Former Smoker     Types: Cigarettes     Last attempt to quit: 2018     Years since quittin.4    Smokeless tobacco: Never Used   Substance Use Topics    Alcohol use: No       Not on File    Outpatient Medications Marked as Taking for the 19 encounter (Office Visit) with Adenike Collins MD   Medication Sig Dispense Refill    albuterol (PROVENTIL HFA, VENTOLIN HFA, PROAIR HFA) 90 mcg/actuation inhaler Take  by inhalation.  sotalol (BETAPACE) 80 mg tablet Take 1 Tab by mouth two (2) times a day.  60 Tab 3        Visit Vitals  /86 (BP 1 Location: Right arm, BP Patient Position: Standing)   Pulse 80   Ht 5' 10\" (1.778 m)   BMI 25.97 kg/m²     Physical Exam   Constitutional: He is oriented to person, place, and time. He appears well-developed and well-nourished. No distress. HENT:   Head: Atraumatic. Mouth/Throat: No oropharyngeal exudate. Eyes: Conjunctivae are normal. Right eye exhibits no discharge. Left eye exhibits no discharge. No scleral icterus. Neck: Normal range of motion. Neck supple. No JVD present. No tracheal deviation present. No thyromegaly present. Cardiovascular: Normal rate and regular rhythm. Exam reveals no gallop. No murmur heard. Pulmonary/Chest: Effort normal and breath sounds normal. No stridor. He has no wheezes. He has no rales. ICD in the left chest wall   Abdominal: Soft. There is no tenderness. There is no rebound and no guarding. Musculoskeletal: Normal range of motion. He exhibits no edema or tenderness. Lymphadenopathy:     He has no cervical adenopathy. Neurological: He is alert and oriented to person, place, and time. He exhibits normal muscle tone. Skin: Skin is warm. He is not diaphoretic. Psychiatric: He has a normal mood and affect. His behavior is normal.     ekg sinus rhythm with no acute st-t changes ( has mild RBBB pattern ? ??? brugada )  Echo 2016:  NORMAL LEFT VENTRICULAR CAVITY SIZE AND SYSTOLIC FUNCTION WITH AN EJECTION FRACTION OF 65%. NORMAL DIASTOLIC FILLING PATTERN. MILDLY DILATED RIGHT VENTRICULAR SIZE. MILD MITRAL REGURGITATION. NO EVIDENCE OF AORTIC REGURGITATION. TRACE OF TRICUSPID REGURGITATION. NORMAL PULMONARY ARTERY PRESSURE OF 17 MMHG. NO EVIDENCE OF PULMONIC REGURGITATION. NO PREVIOUS REPORT FOR COMPARISON. ASSESSMENT and PLAN    ICD-10-CM ICD-9-CM    1. ICD (implantable cardioverter-defibrillator) in place Z95.810 V45.02    2. Ventricular fibrillation (HCC) I49.01 427.41    3.  Dizziness R42 780.4      No orders of the defined types were placed in this encounter. current treatment plan is effective, no change in therapy. Patient with h/o Vfib s/p ICD -- had one episode of Vtach following implant-- was initially on amiodarone which was changed to sotalol 80mg bid. He unable to tolerate additional BB. Seen after recent ICD discharge. Appropriate shock in setting of ventricular tachycardia. Reports taking sotalol twice daily every day. QTc normal.  Will start antivert for dizziness.   F/u in 1 month

## 2019-07-17 NOTE — PROGRESS NOTES
1. Have you been to the ER, urgent care clinic since your last visit? Hospitalized since your last visit? Yes where: [facility]    2. Have you seen or consulted any other health care providers outside of the [address] [place] [place] [address] since your last visit? Include any pap smears or colon screening. No     3. Since your last visit, have you had any of the following symptoms? chest pains, palpitations, shortness of breath and dizziness. 4.  Have you had any blood work, X-rays or cardiac testing? Yes Where: [facility] Reason for visit: Labs         5. Where do you normally have your labs drawn? [facility]    6. Do you need any refills today?    No

## 2019-08-14 ENCOUNTER — OFFICE VISIT (OUTPATIENT)
Dept: CARDIOLOGY CLINIC | Age: 34
End: 2019-08-14

## 2019-08-14 VITALS
DIASTOLIC BLOOD PRESSURE: 73 MMHG | WEIGHT: 175.8 LBS | HEIGHT: 70 IN | HEART RATE: 60 BPM | SYSTOLIC BLOOD PRESSURE: 126 MMHG | BODY MASS INDEX: 25.17 KG/M2

## 2019-08-14 DIAGNOSIS — I49.01 VENTRICULAR FIBRILLATION (HCC): ICD-10-CM

## 2019-08-14 DIAGNOSIS — Z95.810 ICD (IMPLANTABLE CARDIOVERTER-DEFIBRILLATOR) IN PLACE: Primary | ICD-10-CM

## 2019-08-14 DIAGNOSIS — R42 DIZZINESS: ICD-10-CM

## 2019-08-14 RX ORDER — SOTALOL HYDROCHLORIDE 80 MG/1
80 TABLET ORAL 2 TIMES DAILY
Qty: 60 TAB | Refills: 3 | Status: SHIPPED | OUTPATIENT
Start: 2019-08-14 | End: 2019-12-26 | Stop reason: SDUPTHER

## 2019-08-14 NOTE — PROGRESS NOTES
HISTORY OF PRESENT ILLNESS  Danita Rosales is a 29 y.o. male. Dizziness   The history is provided by the patient. This is a chronic problem. The problem occurs every several days. The problem has been gradually improving. Pertinent negatives include no shortness of breath. Review of Systems   Constitutional: Negative for chills, diaphoresis, malaise/fatigue and weight loss. HENT: Negative for congestion, ear discharge, hearing loss, nosebleeds and tinnitus. Eyes: Negative for blurred vision. Respiratory: Negative for shortness of breath and stridor. Cardiovascular: Negative for palpitations. Gastrointestinal: Negative for heartburn and nausea. Musculoskeletal: Negative for myalgias. Skin: Negative for itching. Neurological: Positive for dizziness. Negative for tingling, tremors, focal weakness, loss of consciousness and weakness. Psychiatric/Behavioral: Negative for depression and suicidal ideas. Family History   Problem Relation Age of Onset    Heart Surgery Mother        Past Medical History:   Diagnosis Date    Chest pain 2018    atypical muscular or gerd     ICD (implantable cardioverter-defibrillator) in place     Palpitation 2018    recent compliant to sotalol qtc normal on er ekg       Past Surgical History:   Procedure Laterality Date    HX PACEMAKER         Social History     Tobacco Use    Smoking status: Former Smoker     Types: Cigarettes     Last attempt to quit: 2018     Years since quittin.4    Smokeless tobacco: Never Used   Substance Use Topics    Alcohol use: No       Not on File    Outpatient Medications Marked as Taking for the 19 encounter (Office Visit) with Meredith Coughlin MD   Medication Sig Dispense Refill    sotalol (BETAPACE) 80 mg tablet Take 1 Tab by mouth two (2) times a day. 60 Tab 3    famotidine (PEPCID) 20 mg tablet Take 1 Tab by mouth daily.  30 Tab 6    albuterol (PROVENTIL HFA, VENTOLIN HFA, PROAIR HFA) 90 mcg/actuation inhaler Take  by inhalation. Visit Vitals  /73   Pulse 60   Ht 5' 10\" (1.778 m)   Wt 79.7 kg (175 lb 12.8 oz)   BMI 25.22 kg/m²     Physical Exam   Constitutional: He is oriented to person, place, and time. He appears well-developed and well-nourished. No distress. HENT:   Head: Atraumatic. Mouth/Throat: No oropharyngeal exudate. Eyes: Conjunctivae are normal. Right eye exhibits no discharge. Left eye exhibits no discharge. No scleral icterus. Neck: Normal range of motion. Neck supple. No JVD present. No tracheal deviation present. No thyromegaly present. Cardiovascular: Normal rate and regular rhythm. Exam reveals no gallop. No murmur heard. Pulmonary/Chest: Effort normal and breath sounds normal. No stridor. He has no wheezes. He has no rales. ICD in the left chest wall   Abdominal: Soft. There is no tenderness. There is no rebound and no guarding. Musculoskeletal: Normal range of motion. He exhibits no edema or tenderness. Lymphadenopathy:     He has no cervical adenopathy. Neurological: He is alert and oriented to person, place, and time. He exhibits normal muscle tone. Skin: Skin is warm. He is not diaphoretic. Psychiatric: He has a normal mood and affect. His behavior is normal.     ekg sinus rhythm with no acute st-t changes ( has mild RBBB pattern ? ??? brugada )  Echo 2016:  NORMAL LEFT VENTRICULAR CAVITY SIZE AND SYSTOLIC FUNCTION WITH AN EJECTION FRACTION OF 65%. NORMAL DIASTOLIC FILLING PATTERN. MILDLY DILATED RIGHT VENTRICULAR SIZE. MILD MITRAL REGURGITATION. NO EVIDENCE OF AORTIC REGURGITATION. TRACE OF TRICUSPID REGURGITATION. NORMAL PULMONARY ARTERY PRESSURE OF 17 MMHG. NO EVIDENCE OF PULMONIC REGURGITATION. NO PREVIOUS REPORT FOR COMPARISON. ASSESSMENT and PLAN    ICD-10-CM ICD-9-CM    1. ICD (implantable cardioverter-defibrillator) in place Z95.810 V45.02    2.  Ventricular fibrillation (HCC) I49.01 427.41 sotalol (BETAPACE) 80 mg tablet      ECHO ADULT COMPLETE   3. Dizziness R42 780.4      Orders Placed This Encounter    sotalol (BETAPACE) 80 mg tablet     Sig: Take 1 Tab by mouth two (2) times a day. Dispense:  60 Tab     Refill:  3     Follow-up and Dispositions    · Return in about 6 months (around 2/14/2020). current treatment plan is effective, no change in therapy. Patient with h/o Vfib s/p ICD -- had one episode of Vtach following implant-- was initially on amiodarone which was changed to sotalol 80mg bid. He unable to tolerate additional BB. Seen after recent ICD discharge. Appropriate shock in setting of ventricular tachycardia. Reports taking sotalol twice daily every day. QTc normal.  No further ICD discharge. Dizziness mild-- recommend ENT evaluation.   F/u in 6 months

## 2019-08-14 NOTE — PROGRESS NOTES
1. Have you been to the ER, urgent care clinic since your last visit? Hospitalized since your last visit? No     2. Have you seen or consulted any other health care providers outside of the [address] [place] [address] since your last visit? Include any pap smears or colon screening. No     3. Since your last visit, have you had any of the following symptoms?      dizziness. 4.  Have you had any blood work, X-rays or cardiac testing?     }    5. Where do you normally have your labs drawn? PCP    6. Do you need any refills today?    No

## 2019-08-14 NOTE — PATIENT INSTRUCTIONS
Dizziness: Care Instructions  Your Care Instructions  Dizziness is the feeling of unsteadiness or fuzziness in your head. It is different than having vertigo, which is a feeling that the room is spinning or that you are moving or falling. It is also different from lightheadedness, which is the feeling that you are about to faint. It can be hard to know what causes dizziness. Some people feel dizzy when they have migraine headaches. Sometimes bouts of flu can make you feel dizzy. Some medical conditions, such as heart problems or high blood pressure, can make you feel dizzy. Many medicines can cause dizziness, including medicines for high blood pressure, pain, or anxiety. If a medicine causes your symptoms, your doctor may recommend that you stop or change the medicine. If it is a problem with your heart, you may need medicine to help your heart work better. If there is no clear reason for your symptoms, your doctor may suggest watching and waiting for a while to see if the dizziness goes away on its own. Follow-up care is a key part of your treatment and safety. Be sure to make and go to all appointments, and call your doctor if you are having problems. It's also a good idea to know your test results and keep a list of the medicines you take. How can you care for yourself at home? · If your doctor recommends or prescribes medicine, take it exactly as directed. Call your doctor if you think you are having a problem with your medicine. · Do not drive while you feel dizzy. · Try to prevent falls. Steps you can take include:  ? Using nonskid mats, adding grab bars near the tub, and using night-lights. ? Clearing your home so that walkways are free of anything you might trip on.  ? Letting family and friends know that you have been feeling dizzy. This will help them know how to help you. When should you call for help? Call 911 anytime you think you may need emergency care.  For example, call if:    · You passed out (lost consciousness).     · You have dizziness along with symptoms of a heart attack. These may include:  ? Chest pain or pressure, or a strange feeling in the chest.  ? Sweating. ? Shortness of breath. ? Nausea or vomiting. ? Pain, pressure, or a strange feeling in the back, neck, jaw, or upper belly or in one or both shoulders or arms. ? Lightheadedness or sudden weakness. ? A fast or irregular heartbeat.     · You have symptoms of a stroke. These may include:  ? Sudden numbness, tingling, weakness, or loss of movement in your face, arm, or leg, especially on only one side of your body. ? Sudden vision changes. ? Sudden trouble speaking. ? Sudden confusion or trouble understanding simple statements. ? Sudden problems with walking or balance. ? A sudden, severe headache that is different from past headaches.    Call your doctor now or seek immediate medical care if:    · You feel dizzy and have a fever, headache, or ringing in your ears.     · You have new or increased nausea and vomiting.     · Your dizziness does not go away or comes back.    Watch closely for changes in your health, and be sure to contact your doctor if:    · You do not get better as expected. Where can you learn more? Go to http://francisco-arik.info/. Enter C955 in the search box to learn more about \"Dizziness: Care Instructions. \"  Current as of: September 23, 2018  Content Version: 12.1  © 7288-8435 [employer]. Care instructions adapted under license by [employer] (which disclaims liability or warranty for this information). If you have questions about a medical condition or this instruction, always ask your healthcare professional. [place] [address] any warranty or liability for your use of this information. MyChart Activation    Thank you for requesting access to [employer].  Please follow the instructions below to securely access and download your online medical record. [employer] allows you to send messages to your doctor, view your test results, renew your prescriptions, schedule appointments, and more. How Do I Sign Up? 1. In your internet browser, go to [email or web address] Explain My Surgery/National Transcript Centerhart. 2. Click on the First Time User? Click Here link in the Sign In box. You will see the New Member Sign Up page. 3. Enter your [employer] Access Code exactly as it appears below. You will not need to use this code after youve completed the sign-up process. If you do not sign up before the expiration date, you must request a new code. [employer] Access Code: [email or web address]  Expires: 2019  1:42 PM (This is the date your [employer] access code will )    4. Enter the last four digits of your Social Security Number (xxxx) and Date of Birth (mm/dd/yyyy) as indicated and click Submit. You will be taken to the next sign-up page. 5. Create a [employer] ID. This will be your [employer] login ID and cannot be changed, so think of one that is secure and easy to remember. 6. Create a [employer] password. You can change your password at any time. 7. Enter your Password Reset Question and Answer. This can be used at a later time if you forget your password. 8. Enter your e-mail address. You will receive e-mail notification when new information is available in [address] 9. Click Sign Up. You can now view and download portions of your medical record. 10. Click the Download Summary menu link to download a portable copy of your medical information. Additional Information    If you have questions, please visit the Frequently Asked Questions section of the [employer] website at [email or web address] Explain My Surgery/National Transcript Centerhart/. Remember, [employer] is NOT to be used for urgent needs. For medical emergencies, dial 911.

## 2019-10-24 ENCOUNTER — TELEPHONE (OUTPATIENT)
Dept: CARDIOLOGY CLINIC | Age: 34
End: 2019-10-24

## 2019-10-24 NOTE — TELEPHONE ENCOUNTER
Called and spoke to patient per Dr. Jennifer Wynn okay to have surgery. He voices understanding and acceptance of this advice and will call back if any further questions or concerns.

## 2019-10-24 NOTE — TELEPHONE ENCOUNTER
Patient called and stated he will be having cataract surgery on Oct 31, 2019 and wants to know if the anesthesia will effect his heart.  Please advise

## 2019-12-26 DIAGNOSIS — I49.01 VENTRICULAR FIBRILLATION (HCC): ICD-10-CM

## 2019-12-26 RX ORDER — SOTALOL HYDROCHLORIDE 80 MG/1
80 TABLET ORAL 2 TIMES DAILY
Qty: 60 TAB | Refills: 3 | Status: SHIPPED | OUTPATIENT
Start: 2019-12-26 | End: 2020-05-26 | Stop reason: SDUPTHER

## 2019-12-31 ENCOUNTER — TELEPHONE (OUTPATIENT)
Dept: CARDIOLOGY CLINIC | Age: 34
End: 2019-12-31

## 2019-12-31 NOTE — TELEPHONE ENCOUNTER
Spoke with patient and advised that Dr. Melania Carter approved his medication Sotalol 80mg. A follow up visit is needed for any future refill requests. Patient expressed understanding.

## 2020-02-19 ENCOUNTER — OFFICE VISIT (OUTPATIENT)
Dept: CARDIOLOGY CLINIC | Age: 35
End: 2020-02-19

## 2020-02-19 VITALS
BODY MASS INDEX: 25.34 KG/M2 | HEIGHT: 70 IN | DIASTOLIC BLOOD PRESSURE: 76 MMHG | WEIGHT: 177 LBS | SYSTOLIC BLOOD PRESSURE: 134 MMHG | HEART RATE: 75 BPM

## 2020-02-19 DIAGNOSIS — Z95.810 ICD (IMPLANTABLE CARDIOVERTER-DEFIBRILLATOR) IN PLACE: ICD-10-CM

## 2020-02-19 DIAGNOSIS — I49.01 VENTRICULAR FIBRILLATION (HCC): Primary | ICD-10-CM

## 2020-02-19 DIAGNOSIS — I10 ESSENTIAL HYPERTENSION: ICD-10-CM

## 2020-02-19 NOTE — PROGRESS NOTES
1. Have you been to the ER, urgent care clinic since your last visit? Hospitalized since your last visit? Yes where: [facility]    2. Have you seen or consulted any other health care providers outside of the [address] [place] [address] since your last visit? Include any pap smears or colon screening. No     3. Since your last visit, have you had any of the following symptoms? .           4. Have you had any blood work, X-rays or cardiac testing? Yes Where: [facility] Reason for visit: Labs        5. Where do you normally have your labs drawn? PCP Office    6. Do you need any refills today?   No

## 2020-02-19 NOTE — PATIENT INSTRUCTIONS
High Blood Pressure: Care Instructions  Overview    It's normal for blood pressure to go up and down throughout the day. But if it stays up, you have high blood pressure. Another name for high blood pressure is hypertension. Despite what a lot of people think, high blood pressure usually doesn't cause headaches or make you feel dizzy or lightheaded. It usually has no symptoms. But it does increase your risk of stroke, heart attack, and other problems. You and your doctor will talk about your risks of these problems based on your blood pressure. Your doctor will give you a goal for your blood pressure. Your goal will be based on your health and your age. Lifestyle changes, such as eating healthy and being active, are always important to help lower blood pressure. You might also take medicine to reach your blood pressure goal.  Follow-up care is a key part of your treatment and safety. Be sure to make and go to all appointments, and call your doctor if you are having problems. It's also a good idea to know your test results and keep a list of the medicines you take. How can you care for yourself at home? Medical treatment  · If you stop taking your medicine, your blood pressure will go back up. You may take one or more types of medicine to lower your blood pressure. Be safe with medicines. Take your medicine exactly as prescribed. Call your doctor if you think you are having a problem with your medicine. · Talk to your doctor before you start taking aspirin every day. Aspirin can help certain people lower their risk of a heart attack or stroke. But taking aspirin isn't right for everyone, because it can cause serious bleeding. · See your doctor regularly. You may need to see the doctor more often at first or until your blood pressure comes down. · If you are taking blood pressure medicine, talk to your doctor before you take decongestants or anti-inflammatory medicine, such as ibuprofen.  Some of these medicines can raise blood pressure. · Learn how to check your blood pressure at home. Lifestyle changes  · Stay at a healthy weight. This is especially important if you put on weight around the waist. Losing even 10 pounds can help you lower your blood pressure. · If your doctor recommends it, get more exercise. Walking is a good choice. Bit by bit, increase the amount you walk every day. Try for at least 30 minutes on most days of the week. You also may want to swim, bike, or do other activities. · Avoid or limit alcohol. Talk to your doctor about whether you can drink any alcohol. · Try to limit how much sodium you eat to less than 2,300 milligrams (mg) a day. Your doctor may ask you to try to eat less than 1,500 mg a day. · Eat plenty of fruits (such as bananas and oranges), vegetables, legumes, whole grains, and low-fat dairy products. · Lower the amount of saturated fat in your diet. Saturated fat is found in animal products such as milk, cheese, and meat. Limiting these foods may help you lose weight and also lower your risk for heart disease. · Do not smoke. Smoking increases your risk for heart attack and stroke. If you need help quitting, talk to your doctor about stop-smoking programs and medicines. These can increase your chances of quitting for good. When should you call for help? Call  911 anytime you think you may need emergency care. This may mean having symptoms that suggest that your blood pressure is causing a serious heart or blood vessel problem. Your blood pressure may be over 180/120.   For example, call  911 if:    · You have symptoms of a heart attack. These may include:  ? Chest pain or pressure, or a strange feeling in the chest.  ? Sweating. ? Shortness of breath. ? Nausea or vomiting. ? Pain, pressure, or a strange feeling in the back, neck, jaw, or upper belly or in one or both shoulders or arms. ? Lightheadedness or sudden weakness.   ? A fast or irregular heartbeat.     · You have symptoms of a stroke. These may include:  ? Sudden numbness, tingling, weakness, or loss of movement in your face, arm, or leg, especially on only one side of your body. ? Sudden vision changes. ? Sudden trouble speaking. ? Sudden confusion or trouble understanding simple statements. ? Sudden problems with walking or balance. ? A sudden, severe headache that is different from past headaches.     · You have severe back or belly pain.    Do not wait until your blood pressure comes down on its own. Get help right away.   Call your doctor now or seek immediate care if:    · Your blood pressure is much higher than normal (such as 180/120 or higher), but you don't have symptoms.     · You think high blood pressure is causing symptoms, such as:  ? Severe headache.  ? Blurry vision.    Watch closely for changes in your health, and be sure to contact your doctor if:    · Your blood pressure measures higher than your doctor recommends at least 2 times. That means the top number is higher or the bottom number is higher, or both.     · You think you may be having side effects from your blood pressure medicine. Where can you learn more? Go to http://francisco-arik.info/. Enter C481 in the search box to learn more about \"High Blood Pressure: Care Instructions. \"  Current as of: April 9, 2019  Content Version: 12.2  © 1258-9663 Health[employer], Incorporated. Care instructions adapted under license by [employer] (which disclaims liability or warranty for this information). If you have questions about a medical condition or this instruction, always ask your healthcare professional. [address] any warranty or liability for your use of this information. [employer] Activation    Thank you for requesting access to [employer]. Please follow the instructions below to securely access and download your online medical record.  [employer] allows you to send messages to your doctor, view your test results, renew your prescriptions, schedule appointments, and more. How Do I Sign Up? 1. In your internet browser, go to [email or web address]. 2. Click on the First Time User? Click Here link in the Sign In box. You will see the New Member Sign Up page. 3. Enter your [employer] Access Code exactly as it appears below. You will not need to use this code after youve completed the sign-up process. If you do not sign up before the expiration date, you must request a new code. [employer] Access Code: [email or web address]  Expires: 2020 10:00 AM (This is the date your [employer] access code will )    4. Enter the last four digits of your Social Security Number (xxxx) and Date of Birth (mm/dd/yyyy) as indicated and click Submit. You will be taken to the next sign-up page. 5. Create a [employer] ID. This will be your [employer] login ID and cannot be changed, so think of one that is secure and easy to remember. 6. Create a [employer] password. You can change your password at any time. 7. Enter your Password Reset Question and Answer. This can be used at a later time if you forget your password. 8. Enter your e-mail address. You will receive e-mail notification when new information is available in [address] 9. Click Sign Up. You can now view and download portions of your medical record. 10. Click the Download Summary menu link to download a portable copy of your medical information. Additional Information    If you have questions, please visit the Frequently Asked Questions section of the [employer] website at [email or web address] Remember, [employer] is NOT to be used for urgent needs. For medical emergencies, dial 911.

## 2020-02-19 NOTE — PROGRESS NOTES
HISTORY OF PRESENT ILLNESS  Niecy Patten is a 28 y.o. male. Dizziness   The history is provided by the patient. This is a chronic problem. The problem occurs every several days. The problem has been gradually improving. Pertinent negatives include no shortness of breath. Review of Systems   Constitutional: Negative for chills, diaphoresis, malaise/fatigue and weight loss. HENT: Negative for congestion, ear discharge, hearing loss, nosebleeds and tinnitus. Eyes: Negative for blurred vision. Respiratory: Negative for shortness of breath and stridor. Cardiovascular: Negative for palpitations. Gastrointestinal: Negative for heartburn and nausea. Musculoskeletal: Negative for myalgias. Skin: Negative for itching. Neurological: Positive for dizziness. Negative for tingling, tremors, focal weakness, loss of consciousness and weakness. Psychiatric/Behavioral: Negative for depression and suicidal ideas. Family History   Problem Relation Age of Onset    Heart Surgery Mother        Past Medical History:   Diagnosis Date    Chest pain 2018    atypical muscular or gerd     ICD (implantable cardioverter-defibrillator) in place     Palpitation 2018    recent compliant to sotalol qtc normal on er ekg       Past Surgical History:   Procedure Laterality Date    HX PACEMAKER         Social History     Tobacco Use    Smoking status: Former Smoker     Types: Cigarettes     Last attempt to quit: 2018     Years since quittin.0    Smokeless tobacco: Never Used   Substance Use Topics    Alcohol use: No       Not on File    Outpatient Medications Marked as Taking for the 20 encounter (Office Visit) with Amada Perkins MD   Medication Sig Dispense Refill    sotalol (BETAPACE) 80 mg tablet Take 1 Tab by mouth two (2) times a day. 60 Tab 3    famotidine (PEPCID) 20 mg tablet Take 1 Tab by mouth daily.  30 Tab 6    albuterol (PROVENTIL HFA, VENTOLIN HFA, PROAIR HFA) 90 mcg/actuation inhaler Take  by inhalation. Visit Vitals  /76   Pulse 75   Ht 5' 10\" (1.778 m)   Wt 80.3 kg (177 lb)   BMI 25.40 kg/m²     Physical Exam   Constitutional: He is oriented to person, place, and time. He appears well-developed and well-nourished. No distress. HENT:   Head: Atraumatic. Mouth/Throat: No oropharyngeal exudate. Eyes: Conjunctivae are normal. Right eye exhibits no discharge. Left eye exhibits no discharge. No scleral icterus. Neck: Normal range of motion. Neck supple. No JVD present. No tracheal deviation present. No thyromegaly present. Cardiovascular: Normal rate and regular rhythm. Exam reveals no gallop. No murmur heard. Pulmonary/Chest: Effort normal and breath sounds normal. No stridor. He has no wheezes. He has no rales. ICD in the left chest wall   Abdominal: Soft. There is no abdominal tenderness. There is no rebound and no guarding. Musculoskeletal: Normal range of motion. General: No tenderness or edema. Lymphadenopathy:     He has no cervical adenopathy. Neurological: He is alert and oriented to person, place, and time. He exhibits normal muscle tone. Skin: Skin is warm. He is not diaphoretic. Psychiatric: He has a normal mood and affect. His behavior is normal.     ekg sinus rhythm with no acute st-t changes ( has mild RBBB pattern ? ??? brugada )  Echo 2016:  NORMAL LEFT VENTRICULAR CAVITY SIZE AND SYSTOLIC FUNCTION WITH AN EJECTION FRACTION OF 65%. NORMAL DIASTOLIC FILLING PATTERN. MILDLY DILATED RIGHT VENTRICULAR SIZE. MILD MITRAL REGURGITATION. NO EVIDENCE OF AORTIC REGURGITATION. TRACE OF TRICUSPID REGURGITATION. NORMAL PULMONARY ARTERY PRESSURE OF 17 MMHG. NO EVIDENCE OF PULMONIC REGURGITATION. NO PREVIOUS REPORT FOR COMPARISON.  01/20/20   ECHO ADULT COMPLETE 01/22/2020 1/22/2020    Narrative · Normal cavity size, wall thickness, systolic function (ejection fraction   normal) and diastolic function.  Estimated left ventricular ejection   fraction is 55 - 60%. No regional wall motion abnormality noted. · Normal right ventricular size and function. · No hemodynamically significant valvular pathology. Signed by: Ammon Watson MD     ASSESSMENT and PLAN    ICD-10-CM ICD-9-CM    1. Ventricular fibrillation (HCC) I49.01 427.41    2. Essential hypertension I10 401.9 AMB POC EKG ROUTINE W/ 12 LEADS, INTER & REP   3. ICD (implantable cardioverter-defibrillator) in place Z95.810 V45.02      Orders Placed This Encounter    AMB POC EKG ROUTINE W/ 12 LEADS, INTER & REP     Order Specific Question:   Reason for Exam:     Answer:   routine     Follow-up and Dispositions    · Return in about 6 months (around 8/19/2020). current treatment plan is effective, no change in therapy. Patient with h/o Vfib s/p ICD -- had one episode of Vtach following implant-- was initially on amiodarone which was changed to sotalol 80mg bid. He unable to tolerate additional BB. Had an episode of ICD discharge few months ago. No recurrence since the last appointment. Continues to be on sotalol. EKG reviewed has normal QTC. Continue current medications and follow-up in 6 months.

## 2020-03-25 ENCOUNTER — TELEPHONE (OUTPATIENT)
Dept: CARDIOLOGY CLINIC | Age: 35
End: 2020-03-25

## 2020-03-25 NOTE — TELEPHONE ENCOUNTER
Patient  Called and stated was in ER last night and they gave him Bactrim 800 mg. Can he take this medication with heart medicine.  Please advise

## 2020-03-25 NOTE — TELEPHONE ENCOUNTER
Spoke with patient per Dana Cowart NP, Per Shala there is no interaction between Sotalol and Bactrim. He voices understanding and acceptance of this advice and will call back if any further questions or concerns.

## 2020-05-26 DIAGNOSIS — I49.01 VENTRICULAR FIBRILLATION (HCC): ICD-10-CM

## 2020-05-26 RX ORDER — SOTALOL HYDROCHLORIDE 80 MG/1
80 TABLET ORAL 2 TIMES DAILY
Qty: 120 TAB | Refills: 2 | Status: SHIPPED | OUTPATIENT
Start: 2020-05-26 | End: 2020-07-21 | Stop reason: ALTCHOICE

## 2020-07-16 ENCOUNTER — TELEPHONE (OUTPATIENT)
Dept: CARDIOLOGY CLINIC | Age: 35
End: 2020-07-16

## 2020-07-16 NOTE — TELEPHONE ENCOUNTER
Called and spoke to patient per Dr. Yee Lopez, to see Dr. Juan Pablo montiel post ER for Vtach. He voices understanding and acceptance of this advice and will call back if any further questions or concerns.

## 2020-07-21 ENCOUNTER — OFFICE VISIT (OUTPATIENT)
Dept: CARDIOLOGY CLINIC | Age: 35
End: 2020-07-21

## 2020-07-21 VITALS
SYSTOLIC BLOOD PRESSURE: 141 MMHG | BODY MASS INDEX: 26.45 KG/M2 | WEIGHT: 184.8 LBS | TEMPERATURE: 98.7 F | OXYGEN SATURATION: 99 % | DIASTOLIC BLOOD PRESSURE: 68 MMHG | HEART RATE: 65 BPM | HEIGHT: 70 IN

## 2020-07-21 DIAGNOSIS — R42 DIZZINESS: ICD-10-CM

## 2020-07-21 DIAGNOSIS — I49.01 VENTRICULAR FIBRILLATION (HCC): Primary | ICD-10-CM

## 2020-07-21 DIAGNOSIS — I10 ESSENTIAL HYPERTENSION: ICD-10-CM

## 2020-07-21 DIAGNOSIS — Z95.810 ICD (IMPLANTABLE CARDIOVERTER-DEFIBRILLATOR) IN PLACE: ICD-10-CM

## 2020-07-21 RX ORDER — AMIODARONE HYDROCHLORIDE 200 MG/1
200 TABLET ORAL DAILY
Qty: 30 TAB | Refills: 6 | Status: SHIPPED | OUTPATIENT
Start: 2020-07-21 | End: 2020-10-23 | Stop reason: ALTCHOICE

## 2020-07-21 RX ORDER — AMIODARONE HYDROCHLORIDE 400 MG/1
400 TABLET ORAL 2 TIMES DAILY
Qty: 28 TAB | Refills: 0 | Status: SHIPPED | OUTPATIENT
Start: 2020-07-21 | End: 2020-10-23 | Stop reason: ALTCHOICE

## 2020-07-21 NOTE — PROGRESS NOTES
HISTORY OF PRESENT ILLNESS  Maria Alejandra Mathur is a 28 y.o. male. Dizziness   The history is provided by the patient. This is a chronic problem. The problem occurs every several days. The problem has been gradually improving. Pertinent negatives include no shortness of breath. ED Follow-up   The history is provided by the patient. This is a chronic problem. The problem occurs rarely. Pertinent negatives include no shortness of breath. Review of Systems   Constitutional: Negative for chills, diaphoresis, malaise/fatigue and weight loss. HENT: Negative for congestion, ear discharge, hearing loss, nosebleeds and tinnitus. Eyes: Negative for blurred vision. Respiratory: Negative for shortness of breath and stridor. Cardiovascular: Negative for palpitations. Gastrointestinal: Negative for heartburn and nausea. Musculoskeletal: Negative for myalgias. Skin: Negative for itching. Neurological: Positive for dizziness. Negative for tingling, tremors, focal weakness, loss of consciousness and weakness. Psychiatric/Behavioral: Negative for depression and suicidal ideas.      Family History   Problem Relation Age of Onset    Heart Surgery Mother        Past Medical History:   Diagnosis Date    Chest pain 2018    atypical muscular or gerd     ICD (implantable cardioverter-defibrillator) in place     Palpitation 2018    recent compliant to sotalol qtc normal on er ekg       Past Surgical History:   Procedure Laterality Date    HX PACEMAKER         Social History     Tobacco Use    Smoking status: Former Smoker     Types: Cigarettes     Last attempt to quit: 2018     Years since quittin.4    Smokeless tobacco: Never Used   Substance Use Topics    Alcohol use: No       No Known Allergies    Outpatient Medications Marked as Taking for the 20 encounter (Office Visit) with Margarito Farnsworth MD   Medication Sig Dispense Refill    amiodarone (PACERONE) 400 mg tablet Take 1 Tab by mouth two (2) times a day. 28 Tab 0    amiodarone (CORDARONE) 200 mg tablet Take 1 Tab by mouth daily. 30 Tab 6    famotidine (PEPCID) 20 mg tablet Take 1 Tab by mouth daily. 30 Tab 6    albuterol (PROVENTIL HFA, VENTOLIN HFA, PROAIR HFA) 90 mcg/actuation inhaler Take  by inhalation. Visit Vitals  /68 (BP 1 Location: Left arm, BP Patient Position: Sitting)   Pulse 65   Temp 98.7 °F (37.1 °C) (Temporal)   Ht 5' 10\" (1.778 m)   Wt 83.8 kg (184 lb 12.8 oz)   SpO2 99%   BMI 26.52 kg/m²     Physical Exam   Constitutional: He is oriented to person, place, and time. He appears well-developed and well-nourished. No distress. HENT:   Head: Atraumatic. Mouth/Throat: No oropharyngeal exudate. Eyes: Conjunctivae are normal. Right eye exhibits no discharge. Left eye exhibits no discharge. No scleral icterus. Neck: Normal range of motion. Neck supple. No JVD present. No tracheal deviation present. No thyromegaly present. Cardiovascular: Normal rate and regular rhythm. Exam reveals no gallop. No murmur heard. Pulmonary/Chest: Effort normal and breath sounds normal. No stridor. He has no wheezes. He has no rales. ICD in the left chest wall   Abdominal: Soft. There is no abdominal tenderness. There is no rebound and no guarding. Musculoskeletal: Normal range of motion. General: No tenderness or edema. Lymphadenopathy:     He has no cervical adenopathy. Neurological: He is alert and oriented to person, place, and time. He exhibits normal muscle tone. Skin: Skin is warm. He is not diaphoretic. Psychiatric: He has a normal mood and affect. His behavior is normal.     ekg sinus rhythm with no acute st-t changes ( has mild RBBB pattern ? ??? brugada )  Echo 2016:  NORMAL LEFT VENTRICULAR CAVITY SIZE AND SYSTOLIC FUNCTION WITH AN EJECTION FRACTION OF 65%. NORMAL DIASTOLIC FILLING PATTERN. MILDLY DILATED RIGHT VENTRICULAR SIZE. MILD MITRAL REGURGITATION.    NO EVIDENCE OF AORTIC REGURGITATION. TRACE OF TRICUSPID REGURGITATION. NORMAL PULMONARY ARTERY PRESSURE OF 17 MMHG. NO EVIDENCE OF PULMONIC REGURGITATION. NO PREVIOUS REPORT FOR COMPARISON.  01/20/20   ECHO ADULT COMPLETE 01/22/2020 1/22/2020    Narrative · Normal cavity size, wall thickness, systolic function (ejection fraction   normal) and diastolic function. Estimated left ventricular ejection   fraction is 55 - 60%. No regional wall motion abnormality noted. · Normal right ventricular size and function. · No hemodynamically significant valvular pathology. Signed by: Manasa Galarza MD     ASSESSMENT and PLAN    ICD-10-CM ICD-9-CM    1. Ventricular fibrillation (HCC)  I49.01 427.41 TSH 3RD GENERATION      PFT DLCO   2. Essential hypertension  I10 401.9    3. ICD (implantable cardioverter-defibrillator) in place  Z95.810 V45.02    4. Dizziness  R42 780.4      Orders Placed This Encounter    TSH 3RD GENERATION     Standing Status:   Future     Standing Expiration Date:   7/22/2021    PFT DLCO     Standing Status:   Future     Standing Expiration Date:   1/21/2021    amiodarone (PACERONE) 400 mg tablet     Sig: Take 1 Tab by mouth two (2) times a day. Dispense:  28 Tab     Refill:  0    amiodarone (CORDARONE) 200 mg tablet     Sig: Take 1 Tab by mouth daily. Dispense:  30 Tab     Refill:  6     Follow-up and Dispositions    · Return in about 1 month (around 8/21/2020). current treatment plan is effective, no change in therapy. Patient with h/o Vfib s/p ICD -- had one episode of Vtach following implant-- was initially on amiodarone which was changed to sotalol 80mg bid. He unable to tolerate additional BB. Was recently admitted to [facility] after an episode of syncope- ICD interrogation revealed VTach which spontaneously resolved-- takes sotalol 80 mg bid-- will discontinue sotalol and start amiodarone 400 mg bid for 2 weeks and then 200 mg daily.   All questions answered

## 2020-07-23 ENCOUNTER — TELEPHONE (OUTPATIENT)
Dept: CARDIOLOGY CLINIC | Age: 35
End: 2020-07-23

## 2020-07-23 NOTE — TELEPHONE ENCOUNTER
Patient called and stated he can not afford amiodarone medication. It will cost him 200 dollars. Please advise.

## 2020-08-21 NOTE — TELEPHONE ENCOUNTER
Forgot to call in one of his prescriptions: Needs adderall refilled. [facility] and message sent to pcp.     Reason for Disposition   Caller requesting a NON-URGENT new prescription or refill and triager unable to refill per unit policy    Protocols used: ST MEDICATION QUESTION CALL-A-AH       Requested Prescriptions     Pending Prescriptions Disp Refills    sotaloL (BETAPACE) 80 mg tablet 120 Tab 2     Sig: Take 1 Tab by mouth two (2) times a day.

## 2020-09-02 ENCOUNTER — OFFICE VISIT (OUTPATIENT)
Dept: CARDIOLOGY CLINIC | Age: 35
End: 2020-09-02

## 2020-09-02 VITALS
SYSTOLIC BLOOD PRESSURE: 125 MMHG | TEMPERATURE: 98 F | HEART RATE: 70 BPM | HEIGHT: 70 IN | BODY MASS INDEX: 26.34 KG/M2 | WEIGHT: 184 LBS | DIASTOLIC BLOOD PRESSURE: 78 MMHG

## 2020-09-02 DIAGNOSIS — I49.01 VENTRICULAR FIBRILLATION (HCC): Primary | ICD-10-CM

## 2020-09-02 DIAGNOSIS — Z95.810 ICD (IMPLANTABLE CARDIOVERTER-DEFIBRILLATOR) IN PLACE: ICD-10-CM

## 2020-09-02 DIAGNOSIS — R42 DIZZINESS: ICD-10-CM

## 2020-09-02 DIAGNOSIS — I10 ESSENTIAL HYPERTENSION: ICD-10-CM

## 2020-09-02 RX ORDER — ALBUTEROL SULFATE 90 UG/1
1 AEROSOL, METERED RESPIRATORY (INHALATION)
Qty: 1 INHALER | Refills: 0 | Status: SHIPPED | OUTPATIENT
Start: 2020-09-02 | End: 2021-05-05 | Stop reason: SDUPTHER

## 2020-09-02 RX ORDER — FAMOTIDINE 20 MG/1
20 TABLET, FILM COATED ORAL DAILY
Qty: 30 TAB | Refills: 0 | Status: SHIPPED | OUTPATIENT
Start: 2020-09-02 | End: 2020-10-23 | Stop reason: ALTCHOICE

## 2020-09-02 RX ORDER — SOTALOL HYDROCHLORIDE 80 MG/1
80 TABLET ORAL 2 TIMES DAILY
COMMUNITY
End: 2020-10-23 | Stop reason: SDUPTHER

## 2020-09-02 NOTE — PROGRESS NOTES
1. Have you been to the ER, urgent care clinic since your last visit? Hospitalized since your last visit?     no  2. Have you seen or consulted any other health care providers outside of the [address] [place] [address] since your last visit? Include any pap smears or colon screening. No     3. Since your last visit, have you had any of the following symptoms? shortness of breath and dizziness.

## 2020-09-02 NOTE — PROGRESS NOTES
HISTORY OF PRESENT ILLNESS  Yara Kemp is a 28 y.o. male. Dizziness   The history is provided by the patient. This is a chronic problem. The problem occurs every several days. The problem has been gradually improving. Review of Systems   Constitutional: Negative for chills, diaphoresis, malaise/fatigue and weight loss. HENT: Negative for congestion, ear discharge, hearing loss, nosebleeds and tinnitus. Eyes: Negative for blurred vision. Respiratory: Negative for stridor. Cardiovascular: Negative for palpitations. Gastrointestinal: Negative for heartburn and nausea. Musculoskeletal: Negative for myalgias. Skin: Negative for itching. Neurological: Positive for dizziness. Negative for tingling, tremors, focal weakness, loss of consciousness and weakness. Psychiatric/Behavioral: Negative for depression and suicidal ideas. Family History   Problem Relation Age of Onset    Heart Surgery Mother        Past Medical History:   Diagnosis Date    Chest pain 2018    atypical muscular or gerd     ICD (implantable cardioverter-defibrillator) in place     Palpitation 2018    recent compliant to sotalol qtc normal on er ekg       Past Surgical History:   Procedure Laterality Date    HX PACEMAKER         Social History     Tobacco Use    Smoking status: Former Smoker     Types: Cigarettes     Last attempt to quit: 2018     Years since quittin.5    Smokeless tobacco: Never Used   Substance Use Topics    Alcohol use: No       No Known Allergies    Outpatient Medications Marked as Taking for the 20 encounter (Office Visit) with Odalis Robles MD   Medication Sig Dispense Refill    famotidine (Pepcid) 20 mg tablet Take 1 Tab by mouth daily. 30 Tab 0    albuterol (PROVENTIL HFA, VENTOLIN HFA, PROAIR HFA) 90 mcg/actuation inhaler Take 1 Puff by inhalation every four (4) hours as needed for Wheezing.  1 Inhaler 0    sotaloL (BETAPACE) 80 mg tablet Take 80 mg by mouth two (2) times a day. Visit Vitals  /78   Pulse 70   Temp 98 °F (36.7 °C) (Temporal)   Ht 5' 10\" (1.778 m)   Wt 83.5 kg (184 lb)   BMI 26.40 kg/m²     Physical Exam   Constitutional: He is oriented to person, place, and time. He appears well-developed and well-nourished. No distress. HENT:   Head: Atraumatic. Mouth/Throat: No oropharyngeal exudate. Eyes: Conjunctivae are normal. Right eye exhibits no discharge. Left eye exhibits no discharge. No scleral icterus. Neck: Normal range of motion. Neck supple. No JVD present. No tracheal deviation present. No thyromegaly present. Cardiovascular: Normal rate and regular rhythm. Exam reveals no gallop. No murmur heard. Pulmonary/Chest: Effort normal and breath sounds normal. No stridor. He has no wheezes. He has no rales. ICD in the left chest wall   Abdominal: Soft. There is no abdominal tenderness. There is no rebound and no guarding. Musculoskeletal: Normal range of motion. General: No tenderness or edema. Lymphadenopathy:     He has no cervical adenopathy. Neurological: He is alert and oriented to person, place, and time. He exhibits normal muscle tone. Skin: Skin is warm. He is not diaphoretic. Psychiatric: He has a normal mood and affect. His behavior is normal.     ekg sinus rhythm with no acute st-t changes ( has mild RBBB pattern ? ??? brugada )  Echo 2016:  NORMAL LEFT VENTRICULAR CAVITY SIZE AND SYSTOLIC FUNCTION WITH AN EJECTION FRACTION OF 65%. NORMAL DIASTOLIC FILLING PATTERN. MILDLY DILATED RIGHT VENTRICULAR SIZE. MILD MITRAL REGURGITATION. NO EVIDENCE OF AORTIC REGURGITATION. TRACE OF TRICUSPID REGURGITATION. NORMAL PULMONARY ARTERY PRESSURE OF 17 MMHG. NO EVIDENCE OF PULMONIC REGURGITATION.    NO PREVIOUS REPORT FOR COMPARISON.  01/20/20   ECHO ADULT COMPLETE 01/22/2020 1/22/2020    Narrative · Normal cavity size, wall thickness, systolic function (ejection fraction   normal) and diastolic function. Estimated left ventricular ejection   fraction is 55 - 60%. No regional wall motion abnormality noted. · Normal right ventricular size and function. · No hemodynamically significant valvular pathology. Signed by: Chiara Ortez MD     ASSESSMENT and PLAN    ICD-10-CM ICD-9-CM    1. Ventricular fibrillation (HCC)  I49.01 427.41    2. ICD (implantable cardioverter-defibrillator) in place  Z95.810 V45.02    3. Essential hypertension  I10 401.9    4. Dizziness  R42 780.4      Orders Placed This Encounter    famotidine (Pepcid) 20 mg tablet     Sig: Take 1 Tab by mouth daily. Dispense:  30 Tab     Refill:  0    albuterol (PROVENTIL HFA, VENTOLIN HFA, PROAIR HFA) 90 mcg/actuation inhaler     Sig: Take 1 Puff by inhalation every four (4) hours as needed for Wheezing. Dispense:  1 Inhaler     Refill:  0     Follow-up and Dispositions    · Return in about 6 months (around 3/2/2021). current treatment plan is effective, no change in therapy. Patient with h/o Vfib s/p ICD -- had one episode of Vtach following implant-- was initially on amiodarone which was changed to sotalol 80mg bid. He unable to tolerate additional BB. Was recently admitted to [facility] after an episode of syncope- ICD interrogation revealed VTach which spontaneously resolved-- takes sotalol 80 mg bid--   Was prescribed amiodarone-- however not on it due to cost-- continues to take sotalol. Recent interrogation-- no further vtach.   F/u in 6 months

## 2020-09-02 NOTE — PATIENT INSTRUCTIONS
High Blood Pressure: Care Instructions Overview It's normal for blood pressure to go up and down throughout the day. But if it stays up, you have high blood pressure. Another name for high blood pressure is hypertension. Despite what a lot of people think, high blood pressure usually doesn't cause headaches or make you feel dizzy or lightheaded. It usually has no symptoms. But it does increase your risk of stroke, heart attack, and other problems. You and your doctor will talk about your risks of these problems based on your blood pressure. Your doctor will give you a goal for your blood pressure. Your goal will be based on your health and your age. Lifestyle changes, such as eating healthy and being active, are always important to help lower blood pressure. You might also take medicine to reach your blood pressure goal. 
Follow-up care is a key part of your treatment and safety. Be sure to make and go to all appointments, and call your doctor if you are having problems. It's also a good idea to know your test results and keep a list of the medicines you take. How can you care for yourself at home? Medical treatment · If you stop taking your medicine, your blood pressure will go back up. You may take one or more types of medicine to lower your blood pressure. Be safe with medicines. Take your medicine exactly as prescribed. Call your doctor if you think you are having a problem with your medicine. · Talk to your doctor before you start taking aspirin every day. Aspirin can help certain people lower their risk of a heart attack or stroke. But taking aspirin isn't right for everyone, because it can cause serious bleeding. · See your doctor regularly. You may need to see the doctor more often at first or until your blood pressure comes down. · If you are taking blood pressure medicine, talk to your doctor before you take decongestants or anti-inflammatory medicine, such as ibuprofen. Some of these medicines can raise blood pressure. · Learn how to check your blood pressure at home. Lifestyle changes · Stay at a healthy weight. This is especially important if you put on weight around the waist. Losing even 10 pounds can help you lower your blood pressure. · If your doctor recommends it, get more exercise. Walking is a good choice. Bit by bit, increase the amount you walk every day. Try for at least 30 minutes on most days of the week. You also may want to swim, bike, or do other activities. · Avoid or limit alcohol. Talk to your doctor about whether you can drink any alcohol. · Try to limit how much sodium you eat to less than 2,300 milligrams (mg) a day. Your doctor may ask you to try to eat less than 1,500 mg a day. · Eat plenty of fruits (such as bananas and oranges), vegetables, legumes, whole grains, and low-fat dairy products. · Lower the amount of saturated fat in your diet. Saturated fat is found in animal products such as milk, cheese, and meat. Limiting these foods may help you lose weight and also lower your risk for heart disease. · Do not smoke. Smoking increases your risk for heart attack and stroke. If you need help quitting, talk to your doctor about stop-smoking programs and medicines. These can increase your chances of quitting for good. When should you call for help? Call  911 anytime you think you may need emergency care. This may mean having symptoms that suggest that your blood pressure is causing a serious heart or blood vessel problem. Your blood pressure may be over 180/120. For example, call 911 if: 
· You have symptoms of a heart attack. These may include: 
? Chest pain or pressure, or a strange feeling in the chest. 
? Sweating. ? Shortness of breath. ? Nausea or vomiting. ? Pain, pressure, or a strange feeling in the back, neck, jaw, or upper belly or in one or both shoulders or arms. ? Lightheadedness or sudden weakness. ? A fast or irregular heartbeat. · You have symptoms of a stroke. These may include: 
? Sudden numbness, tingling, weakness, or loss of movement in your face, arm, or leg, especially on only one side of your body. ? Sudden vision changes. ? Sudden trouble speaking. ? Sudden confusion or trouble understanding simple statements. ? Sudden problems with walking or balance. ? A sudden, severe headache that is different from past headaches. · You have severe back or belly pain. Do not wait until your blood pressure comes down on its own. Get help right away. Call your doctor now or seek immediate care if: 
· Your blood pressure is much higher than normal (such as 180/120 or higher), but you don't have symptoms. · You think high blood pressure is causing symptoms, such as: 
? Severe headache. 
? Blurry vision. Watch closely for changes in your health, and be sure to contact your doctor if: 
· Your blood pressure measures higher than your doctor recommends at least 2 times. That means the top number is higher or the bottom number is higher, or both. · You think you may be having side effects from your blood pressure medicine. Where can you learn more? Go to http://francisco-arik.info/ Enter B569 in the search box to learn more about \"High Blood Pressure: Care Instructions. \" Current as of: December 16, 2019               Content Version: 12.5 © 7649-6807 Healthwise, Incorporated. Care instructions adapted under license by [employer] (which disclaims liability or warranty for this information). If you have questions about a medical condition or this instruction, always ask your healthcare professional. [place] [address] any warranty or liability for your use of this information. MyChart Activation Thank you for requesting access to [email or web address]. Please follow the instructions below to securely access and download your online medical record.  [email or web address] allows you to send messages to your doctor, view your test results, renew your prescriptions, schedule appointments, and more. How Do I Sign Up? 1. In your internet browser, go to [email or web address]. 2. Click on the First Time User? Click Here link in the Sign In box. You will see the New Member Sign Up page. 3. Enter your Publons Access Code exactly as it appears below. You will not need to use this code after youve completed the sign-up process. If you do not sign up before the expiration date, you must request a new code. Publons Access Code:  [address] Expires: 10/17/2020 11:36 AM (This is the date your Publons access code will ) 4. Enter the last four digits of your Social Security Number (xxxx) and Date of Birth (mm/dd/yyyy) as indicated and click Submit. You will be taken to the next sign-up page. 5. Create a Publons ID. This will be your Publons login ID and cannot be changed, so think of one that is secure and easy to remember. 6. Create a Publons password. You can change your password at any time. 7. Enter your Password Reset Question and Answer. This can be used at a later time if you forget your password. 8. Enter your e-mail address. You will receive e-mail notification when new information is available in [address] 9. Click Sign Up. You can now view and download portions of your medical record. 10. Click the Download Summary menu link to download a portable copy of your medical information. Additional Information If you have questions, please visit the Frequently Asked Questions section of the Publons website at [email or web address] Remember, Publons is NOT to be used for urgent needs. For medical emergencies, dial 911.

## 2020-10-23 ENCOUNTER — OFFICE VISIT (OUTPATIENT)
Dept: CARDIOLOGY CLINIC | Age: 35
End: 2020-10-23
Payer: MEDICARE

## 2020-10-23 VITALS
HEART RATE: 74 BPM | BODY MASS INDEX: 26.26 KG/M2 | HEIGHT: 70 IN | SYSTOLIC BLOOD PRESSURE: 134 MMHG | OXYGEN SATURATION: 98 % | WEIGHT: 183.4 LBS | DIASTOLIC BLOOD PRESSURE: 76 MMHG | TEMPERATURE: 98.9 F

## 2020-10-23 DIAGNOSIS — I49.01 VENTRICULAR FIBRILLATION (HCC): Primary | ICD-10-CM

## 2020-10-23 DIAGNOSIS — Z95.810 ICD (IMPLANTABLE CARDIOVERTER-DEFIBRILLATOR) IN PLACE: ICD-10-CM

## 2020-10-23 DIAGNOSIS — I10 ESSENTIAL HYPERTENSION: ICD-10-CM

## 2020-10-23 DIAGNOSIS — Z71.6 TOBACCO ABUSE COUNSELING: ICD-10-CM

## 2020-10-23 PROCEDURE — 99214 OFFICE O/P EST MOD 30 MIN: CPT | Performed by: INTERNAL MEDICINE

## 2020-10-23 PROCEDURE — G8754 DIAS BP LESS 90: HCPCS | Performed by: INTERNAL MEDICINE

## 2020-10-23 PROCEDURE — G8432 DEP SCR NOT DOC, RNG: HCPCS | Performed by: INTERNAL MEDICINE

## 2020-10-23 PROCEDURE — G8427 DOCREV CUR MEDS BY ELIG CLIN: HCPCS | Performed by: INTERNAL MEDICINE

## 2020-10-23 PROCEDURE — G8752 SYS BP LESS 140: HCPCS | Performed by: INTERNAL MEDICINE

## 2020-10-23 PROCEDURE — G8419 CALC BMI OUT NRM PARAM NOF/U: HCPCS | Performed by: INTERNAL MEDICINE

## 2020-10-23 PROCEDURE — 93000 ELECTROCARDIOGRAM COMPLETE: CPT | Performed by: INTERNAL MEDICINE

## 2020-10-23 RX ORDER — SOTALOL HYDROCHLORIDE 120 MG/1
120 TABLET ORAL 2 TIMES DAILY
Qty: 180 TAB | Refills: 1 | Status: SHIPPED | OUTPATIENT
Start: 2020-10-23 | End: 2021-04-09

## 2020-10-23 NOTE — PATIENT INSTRUCTIONS
Medications Discontinued During This Encounter Medication Reason  amiodarone (CORDARONE) 200 mg tablet Alternate Therapy  amiodarone (PACERONE) 400 mg tablet Alternate Therapy  famotidine (Pepcid) 20 mg tablet Alternate Therapy  sotaloL (BETAPACE) 80 mg tablet Reorder Learning About Benefits From Quitting Smoking How does quitting smoking make you healthier? If you're thinking about quitting smoking, you may have a few reasons to be smoke-free. Your health may be one of them. · When you quit smoking, you lower your risks for cancer, lung disease, heart attack, stroke, blood vessel disease, and blindness from macular degeneration. · When you're smoke-free, you get sick less often, and you heal faster. You are less likely to get colds, flu, bronchitis, and pneumonia. · As a nonsmoker, you may find that your mood is better and you are less stressed. When and how will you feel healthier? Quitting has real health benefits that start from day 1 of being smoke-free. And the longer you stay smoke-free, the healthier you get and the better you feel. The first hours · After just 20 minutes, your blood pressure and heart rate go down. That means there's less stress on your heart and blood vessels. · Within 12 hours, the level of carbon monoxide in your blood drops back to normal. That makes room for more oxygen. With more oxygen in your body, you may notice that you have more energy than when you smoked. After 2 weeks · Your lungs start to work better. · Your risk of heart attack starts to drop. After 1 month · When your lungs are clear, you cough less and breathe deeper, so it's easier to be active. · Your sense of taste and smell return. That means you can enjoy food more than you have since you started smoking. Over the years · Over the years, your risks of heart disease, heart attack, and stroke are lower.  
· After 10 years, your risk of dying from lung cancer is cut by about half. And your risk for many other types of cancer is lower too. How would quitting help others in your life? When you quit smoking, you improve the health of everyone who now breathes in your smoke. · Their heart, lung, and cancer risks drop, much like yours. · They are sick less. For babies and small children, living smoke-free means they're less likely to have ear infections, pneumonia, and bronchitis. · If you're a woman who is or will be pregnant someday, quitting smoking means a healthier . · Children who are close to you are less likely to become adult smokers. Where can you learn more? Go to http://www.meyer.com/ Enter 052 [id number] 72 11 in the search box to learn more about \"Learning About Benefits From Quitting Smoking. \" Current as of: 2020               Content Version: 12.6 © 5778-2504 [employer], Incorporated. Care instructions adapted under license by [employer] (which disclaims liability or warranty for this information). If you have questions about a medical condition or this instruction, always ask your healthcare professional. [address] any warranty or liability for your use of this information.

## 2020-10-23 NOTE — PROGRESS NOTES
HISTORY OF PRESENT ILLNESS  Brianna Rutledge is a 28 y.o. male. Dizziness   The history is provided by the patient. This is a chronic problem. The problem occurs every several days. The problem has been gradually improving. ED Follow-up   The history is provided by the patient and medical records (ICD discharge). Palpitations    Associated symptoms include dizziness. Pertinent negatives include no diaphoresis, no malaise/fatigue, no nausea and no weakness. Review of Systems   Constitutional: Negative for chills, diaphoresis, malaise/fatigue and weight loss. HENT: Negative for congestion, ear discharge, hearing loss, nosebleeds and tinnitus. Eyes: Negative for blurred vision. Respiratory: Negative for stridor. Cardiovascular: Positive for palpitations. Gastrointestinal: Negative for heartburn and nausea. Musculoskeletal: Negative for myalgias. Skin: Negative for itching. Neurological: Positive for dizziness. Negative for tingling, tremors, focal weakness, loss of consciousness and weakness. Psychiatric/Behavioral: Negative for depression and suicidal ideas.      Family History   Problem Relation Age of Onset    Heart Surgery Mother        Past Medical History:   Diagnosis Date    Chest pain 2018    atypical muscular or gerd     ICD (implantable cardioverter-defibrillator) in place     Palpitation 2018    recent compliant to sotalol qtc normal on er ekg       Past Surgical History:   Procedure Laterality Date    HX PACEMAKER         Social History     Tobacco Use    Smoking status: Former Smoker     Types: Cigarettes     Last attempt to quit: 2018     Years since quittin.6    Smokeless tobacco: Never Used   Substance Use Topics    Alcohol use: No       No Known Allergies    Outpatient Medications Marked as Taking for the 10/23/20 encounter (Office Visit) with Gt Mendez MD   Medication Sig Dispense Refill    albuterol (PROVENTIL HFA, VENTOLIN Alveria Butter HFA) 90 mcg/actuation inhaler Take 1 Puff by inhalation every four (4) hours as needed for Wheezing. 1 Inhaler 0    sotaloL (BETAPACE) 80 mg tablet Take 80 mg by mouth two (2) times a day. Visit Vitals  /76 (BP 1 Location: Left arm, BP Patient Position: Sitting)   Pulse 74   Temp 98.9 °F (37.2 °C) (Temporal)   Ht 5' 10\" (1.778 m)   Wt 83.2 kg (183 lb 6.4 oz)   SpO2 98%   BMI 26.32 kg/m²     Physical Exam   Constitutional: He is oriented to person, place, and time. He appears well-developed and well-nourished. No distress. HENT:   Head: Atraumatic. Mouth/Throat: No oropharyngeal exudate. Eyes: Conjunctivae are normal. Right eye exhibits no discharge. Left eye exhibits no discharge. No scleral icterus. Neck: Normal range of motion. Neck supple. No JVD present. No tracheal deviation present. No thyromegaly present. Cardiovascular: Normal rate and regular rhythm. Exam reveals no gallop. No murmur heard. Pulmonary/Chest: Effort normal and breath sounds normal. No stridor. He has no wheezes. He has no rales. ICD in the left chest wall   Abdominal: Soft. There is no abdominal tenderness. There is no rebound and no guarding. Musculoskeletal: Normal range of motion. General: No tenderness or edema. Lymphadenopathy:     He has no cervical adenopathy. Neurological: He is alert and oriented to person, place, and time. He exhibits normal muscle tone. Skin: Skin is warm. He is not diaphoretic. Psychiatric: He has a normal mood and affect. His behavior is normal.     ekg sinus rhythm with no acute st-t changes ( has mild RBBB pattern ? ??? brugada )  Echo 2016:  NORMAL LEFT VENTRICULAR CAVITY SIZE AND SYSTOLIC FUNCTION WITH AN EJECTION FRACTION OF 65%. NORMAL DIASTOLIC FILLING PATTERN. MILDLY DILATED RIGHT VENTRICULAR SIZE. MILD MITRAL REGURGITATION. NO EVIDENCE OF AORTIC REGURGITATION. TRACE OF TRICUSPID REGURGITATION. NORMAL PULMONARY ARTERY PRESSURE OF 17 MMHG.    NO EVIDENCE OF PULMONIC REGURGITATION. NO PREVIOUS REPORT FOR COMPARISON.  01/20/20   ECHO ADULT COMPLETE 01/22/2020 1/22/2020    Narrative · Normal cavity size, wall thickness, systolic function (ejection fraction   normal) and diastolic function. Estimated left ventricular ejection   fraction is 55 - 60%. No regional wall motion abnormality noted. · Normal right ventricular size and function. · No hemodynamically significant valvular pathology. Signed by: Catherine Dunbar MD     ASSESSMENT and PLAN          Patient with h/o Vfib s/p ICD -- had one episode of Vtach following implant-- was initially on amiodarone which was changed to sotalol 80mg bid. He unable to tolerate additional BB. Was recently admitted to [facility] after an episode of syncope- ICD interrogation revealed VTach which spontaneously resolved-- takes sotalol 80 mg bid--   Was prescribed amiodarone-- however not on it due to cost-- continues to take sotalol. Recent interrogation-- no further vtach. 10/20 had another episode of V. tach/V. fib and received a shock on 10/22/2020. QTC is 395 today. Increase sotalol to 120 twice daily. Follow-up EKG early next week and then in 2 weeks. Discussed with patient and his fiancée the incomplete response of antiarrhythmic drugs to this problem. Also advised not to drive for now. Recommended that he should find a job where he would not hurt himself or others in case he passes out. Strongly recommended to stop smoking and also cut down his caffeine significantly. Diagnoses and all orders for this visit:    1. Ventricular fibrillation (HCC)  -     AMB POC EKG ROUTINE W/ 12 LEADS, INTER & REP  -     sotaloL (BETAPACE) 120 mg tablet; Take 1 Tab by mouth two (2) times a day. -     AMB POC EKG ROUTINE W/ 12 LEADS, INTER & REP; Future  -     AMB POC EKG ROUTINE W/ 12 LEADS, INTER & REP; Future    2. ICD (implantable cardioverter-defibrillator) in place    3. Essential hypertension    4. Tobacco abuse counseling        Pertinent laboratory and test data reviewed and discussed with patient.   See patient instructions also for other medical advice given    Medications Discontinued During This Encounter   Medication Reason    amiodarone (CORDARONE) 200 mg tablet Alternate Therapy    amiodarone (PACERONE) 400 mg tablet Alternate Therapy    famotidine (Pepcid) 20 mg tablet Alternate Therapy    sotaloL (BETAPACE) 80 mg tablet Reorder       Follow-up and Dispositions    · Return in about 3 months (around 1/23/2021), or if symptoms worsen or fail to improve, for post test.

## 2020-10-26 DIAGNOSIS — I49.01 VENTRICULAR FIBRILLATION (HCC): ICD-10-CM

## 2020-10-30 ENCOUNTER — CLINICAL SUPPORT (OUTPATIENT)
Dept: CARDIOLOGY CLINIC | Age: 35
End: 2020-10-30

## 2020-10-30 DIAGNOSIS — I49.01 VENTRICULAR FIBRILLATION (HCC): Primary | ICD-10-CM

## 2020-11-06 DIAGNOSIS — I49.01 VENTRICULAR FIBRILLATION (HCC): ICD-10-CM

## 2020-11-06 PROCEDURE — 93000 ELECTROCARDIOGRAM COMPLETE: CPT | Performed by: INTERNAL MEDICINE

## 2020-12-16 ENCOUNTER — TELEPHONE (OUTPATIENT)
Dept: CARDIOLOGY CLINIC | Age: 35
End: 2020-12-16

## 2020-12-16 NOTE — TELEPHONE ENCOUNTER
Patient wants to know if he my return back to work and if he has any restrictions when he do returned.  Please advise

## 2020-12-17 NOTE — TELEPHONE ENCOUNTER
Spoke with patient today per Dr. Crista Bueno to make an appt with EP Dr. Yanelis Ferrari prior to clearance. He voices understanding and acceptance of this advice and will call back if any further questions or concerns.

## 2021-01-18 ENCOUNTER — OFFICE VISIT (OUTPATIENT)
Dept: CARDIOLOGY CLINIC | Age: 36
End: 2021-01-18
Payer: MEDICARE

## 2021-01-18 VITALS
TEMPERATURE: 98 F | HEIGHT: 70 IN | BODY MASS INDEX: 27.2 KG/M2 | SYSTOLIC BLOOD PRESSURE: 141 MMHG | WEIGHT: 190 LBS | HEART RATE: 71 BPM | DIASTOLIC BLOOD PRESSURE: 84 MMHG

## 2021-01-18 DIAGNOSIS — Z95.810 ICD (IMPLANTABLE CARDIOVERTER-DEFIBRILLATOR) IN PLACE: ICD-10-CM

## 2021-01-18 DIAGNOSIS — I49.01 VENTRICULAR FIBRILLATION (HCC): Primary | ICD-10-CM

## 2021-01-18 PROCEDURE — G8419 CALC BMI OUT NRM PARAM NOF/U: HCPCS | Performed by: INTERNAL MEDICINE

## 2021-01-18 PROCEDURE — 99214 OFFICE O/P EST MOD 30 MIN: CPT | Performed by: INTERNAL MEDICINE

## 2021-01-18 PROCEDURE — G8427 DOCREV CUR MEDS BY ELIG CLIN: HCPCS | Performed by: INTERNAL MEDICINE

## 2021-01-18 PROCEDURE — G8432 DEP SCR NOT DOC, RNG: HCPCS | Performed by: INTERNAL MEDICINE

## 2021-01-18 NOTE — LETTER
NOTIFICATION RETURN TO WORK / SCHOOL 
 
2021 11:30 AM 
 
 
To: [employer] Reference to:  Mackenzie Martinez , : 1985 Mackenzie Martinez is currently under the care of [facility]  from 2021 to 2021. He will return to work/school on: 2021 If there are questions or concerns please have the patient contact our office at [phone]. Sincerely, 
 
 
 
 
Dr. Tiffany Gramajo.  [place] MD, [facility] [place]

## 2021-01-18 NOTE — PROGRESS NOTES
1. Have you been to the ER, urgent care clinic since your last visit? Hospitalized since your last visit?     no2. Have you seen or consulted any other health care providers outside of the [address] [place] [address] since your last visit? Include any pap smears or colon screening. No     3. Since your last visit, have you had any of the following symptoms?      dizziness. 4.  Have you had any blood work, X-rays or cardiac testing? No       5. Where do you normally have your labs drawn? [facility]    6. Do you need any refills today?    no

## 2021-01-18 NOTE — PROGRESS NOTES
HISTORY OF PRESENT ILLNESS  Karsten Arzate is a 39 y.o. male. Palpitations   The history is provided by the patient and medical records (h/o VFib). This is a chronic problem. Associated symptoms include dizziness. Pertinent negatives include no diaphoresis, no malaise/fatigue, no nausea and no weakness. Dizziness  The history is provided by the patient. This is a chronic problem. The problem occurs every several days. The problem has been resolved. Review of Systems   Constitutional: Negative for chills, diaphoresis, malaise/fatigue and weight loss. HENT: Negative for congestion, ear discharge, hearing loss, nosebleeds and tinnitus. Eyes: Negative for blurred vision. Respiratory: Negative for stridor. Cardiovascular: Positive for palpitations. Gastrointestinal: Negative for heartburn and nausea. Musculoskeletal: Negative for myalgias. Skin: Negative for itching. Neurological: Positive for dizziness. Negative for tingling, tremors, focal weakness, loss of consciousness and weakness. Psychiatric/Behavioral: Negative for depression and suicidal ideas.      Family History   Problem Relation Age of Onset    Heart Surgery Mother        Past Medical History:   Diagnosis Date    Chest pain 2018    atypical muscular or gerd     ICD (implantable cardioverter-defibrillator) in place     Palpitation 2018    recent compliant to sotalol qtc normal on er ekg       Past Surgical History:   Procedure Laterality Date    HX PACEMAKER         Social History     Tobacco Use    Smoking status: Former Smoker     Types: Cigarettes     Quit date: 2018     Years since quittin.9    Smokeless tobacco: Never Used   Substance Use Topics    Alcohol use: No       No Known Allergies    Outpatient Medications Marked as Taking for the 21 encounter (Office Visit) with Ngozi Cancino MD   Medication Sig Dispense Refill    sotaloL (BETAPACE) 120 mg tablet Take 1 Tab by mouth two (2) times a day. 180 Tab 1    albuterol (PROVENTIL HFA, VENTOLIN HFA, PROAIR HFA) 90 mcg/actuation inhaler Take 1 Puff by inhalation every four (4) hours as needed for Wheezing. 1 Inhaler 0        Visit Vitals  BP (!) 141/84   Pulse 71   Temp 98 °F (36.7 °C) (Temporal)   Ht 5' 10\" (1.778 m)   Wt 86.2 kg (190 lb)   BMI 27.26 kg/m²     Physical Exam   Constitutional: He is oriented to person, place, and time. He appears well-developed and well-nourished. No distress. HENT:   Head: Atraumatic. Mouth/Throat: No oropharyngeal exudate. Eyes: Conjunctivae are normal. Right eye exhibits no discharge. Left eye exhibits no discharge. No scleral icterus. Neck: Normal range of motion. Neck supple. No JVD present. No tracheal deviation present. No thyromegaly present. Cardiovascular: Normal rate and regular rhythm. Exam reveals no gallop. No murmur heard. Pulmonary/Chest: Effort normal and breath sounds normal. No stridor. He has no wheezes. He has no rales. ICD in the left chest wall   Abdominal: Soft. There is no abdominal tenderness. There is no rebound and no guarding. Musculoskeletal: Normal range of motion. General: No tenderness or edema. Lymphadenopathy:     He has no cervical adenopathy. Neurological: He is alert and oriented to person, place, and time. He exhibits normal muscle tone. Skin: Skin is warm. He is not diaphoretic. Psychiatric: He has a normal mood and affect. His behavior is normal.     ekg sinus rhythm with no acute st-t changes ( has mild RBBB pattern ? ??? brugada )  Echo 2016:  NORMAL LEFT VENTRICULAR CAVITY SIZE AND SYSTOLIC FUNCTION WITH AN EJECTION FRACTION OF 65%. NORMAL DIASTOLIC FILLING PATTERN. MILDLY DILATED RIGHT VENTRICULAR SIZE. MILD MITRAL REGURGITATION. NO EVIDENCE OF AORTIC REGURGITATION. TRACE OF TRICUSPID REGURGITATION. NORMAL PULMONARY ARTERY PRESSURE OF 17 MMHG. NO EVIDENCE OF PULMONIC REGURGITATION.    NO PREVIOUS REPORT FOR COMPARISON.  01/20/20 ECHO ADULT COMPLETE 01/22/2020 1/22/2020    Narrative · Normal cavity size, wall thickness, systolic function (ejection fraction   normal) and diastolic function. Estimated left ventricular ejection   fraction is 55 - 60%. No regional wall motion abnormality noted. · Normal right ventricular size and function. · No hemodynamically significant valvular pathology. Signed by: Stone Singh MD     ASSESSMENT and PLAN          Patient with h/o Vfib s/p ICD -- had one episode of Vtach following implant-- was initially on amiodarone which was changed to sotalol 80mg bid. He unable to tolerate additional BB. Was recently admitted to [facility] after an episode of syncope- ICD interrogation revealed VTach which spontaneously resolved-- takes sotalol 80 mg bid--   Was prescribed amiodarone-- however not on it due to cost-- continues to take sotalol. Recent interrogation-- no further vtach. 10/20 had another episode of V. tach/V. fib and received a shock on 10/22/2020. QTC is 395 today. Increase sotalol to 120 twice daily. Follow-up EKG early next week and then in 2 weeks. Discussed with patient and his fiancée the incomplete response of antiarrhythmic drugs to this problem. Also advised not to drive for now. Recommended that he should find a job where he would not hurt himself or others in case he passes out. Strongly recommended to stop smoking and also cut down his caffeine significantly. 1/21 on present dose of sotalol at 120 twice daily. No more symptoms. 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 There is no danger to himself or others in case he passes out in his present job. Diagnoses and all orders for this visit:    1. Ventricular fibrillation ([address]    2. ICD (implantable cardioverter-defibrillator) in place        Pertinent laboratory and test data reviewed and discussed with patient.   See patient instructions also for other medical advice given    There are no discontinued medications. Follow-up and Dispositions    · Return in about 3 months (around 4/18/2021), or if symptoms worsen or fail to improve, for with ekg.

## 2021-01-18 NOTE — PATIENT INSTRUCTIONS
There are no discontinued medications. please get remote checks for pacer or ICD every 3 months and Office check in 1 yr Please call our office after every transmission to confirm success of transmission A Healthy Heart: Care Instructions Your Care Instructions Coronary artery disease, also called heart disease, occurs when a substance called plaque builds up in the vessels that supply oxygen-rich blood to your heart muscle. This can narrow the blood vessels and reduce blood flow. A heart attack happens when blood flow is completely blocked. A high-fat diet, smoking, and other factors increase the risk of heart disease. Your doctor has found that you have a chance of having heart disease. You can do lots of things to keep your heart healthy. It may not be easy, but you can change your diet, exercise more, and quit smoking. These steps really work to lower your chance of heart disease. Follow-up care is a key part of your treatment and safety. Be sure to make and go to all appointments, and call your doctor if you are having problems. It's also a good idea to know your test results and keep a list of the medicines you take. How can you care for yourself at home? Diet 
  · Use less salt when you cook and eat. This helps lower your blood pressure. Taste food before salting. Add only a little salt when you think you need it. With time, your taste buds will adjust to less salt.  
  · Eat fewer snack items, fast foods, canned soups, and other high-salt, high-fat, processed foods.  
  · Read food labels and try to avoid saturated and trans fats. They increase your risk of heart disease by raising cholesterol levels.  
  · Limit the amount of solid fat-butter, margarine, and shortening-you eat. Use olive, peanut, or canola oil when you cook. Bake, broil, and steam foods instead of frying them.   · Eat a variety of fruit and vegetables every day. Dark green, deep orange, red, or yellow fruits and vegetables are especially good for you. Examples include spinach, carrots, peaches, and berries.  
  · Foods high in fiber can reduce your cholesterol and provide important vitamins and minerals. High-fiber foods include whole-grain cereals and breads, oatmeal, beans, brown rice, citrus fruits, and apples.  
  · Eat lean proteins. Heart-healthy proteins include seafood, lean meats and poultry, eggs, beans, peas, nuts, seeds, and soy products.  
  · Limit drinks and foods with added sugar. These include candy, desserts, and soda pop. Lifestyle changes 
  · If your doctor recommends it, get more exercise. Walking is a good choice. Bit by bit, increase the amount you walk every day. Try for at least 30 minutes on most days of the week. You also may want to swim, bike, or do other activities.  
  · Do not smoke. If you need help quitting, talk to your doctor about stop-smoking programs and medicines. These can increase your chances of quitting for good. Quitting smoking may be the most important step you can take to protect your heart. It is never too late to quit.  
  · Limit alcohol to 2 drinks a day for men and 1 drink a day for women. Too much alcohol can cause health problems.  
  · Manage other health problems such as diabetes, high blood pressure, and high cholesterol. If you think you may have a problem with alcohol or drug use, talk to your doctor. Medicines 
  · Take your medicines exactly as prescribed. Call your doctor if you think you are having a problem with your medicine.  
  · If your doctor recommends aspirin, take the amount directed each day. Make sure you take aspirin and not another kind of pain reliever, such as acetaminophen (Tylenol). When should you call for help? Call 911 if you have symptoms of a heart attack. These may include:   · Chest pain or pressure, or a strange feeling in the chest.  
  · Sweating.  
  · Shortness of breath.  
  · Pain, pressure, or a strange feeling in the back, neck, jaw, or upper belly or in one or both shoulders or arms.  
  · Lightheadedness or sudden weakness.  
  · A fast or irregular heartbeat. After you call 911, the  may tell you to chew 1 adult-strength or 2 to 4 low-dose aspirin. Wait for an ambulance. Do not try to drive yourself. Watch closely for changes in your health, and be sure to contact your doctor if you have any problems. Where can you learn more? Go to http://www.gray.com/ Enter Q029 in the search box to learn more about \"A Healthy Heart: Care Instructions. \" Current as of: December 16, 2019               Content Version: 12.6 © 5140-2484 [employer]. Care instructions adapted under license by [employer] (which disclaims liability or warranty for this information). If you have questions about a medical condition or this instruction, always ask your healthcare professional. [address] any warranty or liability for your use of this information.

## 2021-04-14 ENCOUNTER — OFFICE VISIT (OUTPATIENT)
Dept: CARDIOLOGY CLINIC | Age: 36
End: 2021-04-14
Payer: MEDICARE

## 2021-04-14 VITALS
SYSTOLIC BLOOD PRESSURE: 127 MMHG | HEART RATE: 57 BPM | BODY MASS INDEX: 27.26 KG/M2 | HEIGHT: 70 IN | DIASTOLIC BLOOD PRESSURE: 72 MMHG

## 2021-04-14 DIAGNOSIS — I49.01 VENTRICULAR FIBRILLATION (HCC): Primary | ICD-10-CM

## 2021-04-14 DIAGNOSIS — I10 ESSENTIAL HYPERTENSION: ICD-10-CM

## 2021-04-14 DIAGNOSIS — R42 DIZZINESS: ICD-10-CM

## 2021-04-14 DIAGNOSIS — Z95.810 ICD (IMPLANTABLE CARDIOVERTER-DEFIBRILLATOR) IN PLACE: ICD-10-CM

## 2021-04-14 PROCEDURE — G8419 CALC BMI OUT NRM PARAM NOF/U: HCPCS | Performed by: INTERNAL MEDICINE

## 2021-04-14 PROCEDURE — G8432 DEP SCR NOT DOC, RNG: HCPCS | Performed by: INTERNAL MEDICINE

## 2021-04-14 PROCEDURE — 99214 OFFICE O/P EST MOD 30 MIN: CPT | Performed by: INTERNAL MEDICINE

## 2021-04-14 PROCEDURE — G8428 CUR MEDS NOT DOCUMENT: HCPCS | Performed by: INTERNAL MEDICINE

## 2021-04-14 PROCEDURE — G8754 DIAS BP LESS 90: HCPCS | Performed by: INTERNAL MEDICINE

## 2021-04-14 PROCEDURE — G8752 SYS BP LESS 140: HCPCS | Performed by: INTERNAL MEDICINE

## 2021-04-14 RX ORDER — SOTALOL HYDROCHLORIDE 80 MG/1
80 TABLET ORAL 2 TIMES DAILY
Qty: 60 TAB | Refills: 4 | Status: SHIPPED | OUTPATIENT
Start: 2021-04-14

## 2021-04-14 NOTE — PROGRESS NOTES
1. Have you been to the ER, urgent care clinic since your last visit? Hospitalized since your last visit?     no    2. Have you seen or consulted any other health care providers outside of the [address] [place] [address] since your last visit? Include any pap smears or colon screening. No     3. Since your last visit, have you had any of the following symptoms? shortness of breath and dizziness.

## 2021-04-19 NOTE — PROGRESS NOTES
HISTORY OF PRESENT ILLNESS  Mrago Bradshaw is a 39 y.o. male. Dizziness  The history is provided by the patient. This is a chronic problem. The problem occurs every several days. The problem has been gradually worsening. Review of Systems   Constitutional: Negative for chills, diaphoresis, malaise/fatigue and weight loss. HENT: Negative for congestion, ear discharge, hearing loss, nosebleeds and tinnitus. Eyes: Negative for blurred vision. Respiratory: Negative for stridor. Cardiovascular: Negative for palpitations. Gastrointestinal: Negative for heartburn and nausea. Musculoskeletal: Negative for myalgias. Skin: Negative for itching. Neurological: Positive for dizziness. Negative for tingling, tremors, focal weakness, loss of consciousness and weakness. Psychiatric/Behavioral: Negative for depression and suicidal ideas. Family History   Problem Relation Age of Onset    Heart Surgery Mother        Past Medical History:   Diagnosis Date    Chest pain 8/2/2018    atypical muscular or gerd     ICD (implantable cardioverter-defibrillator) in place     Palpitation 8/2/2018    recent compliant to sotalol qtc normal on er ekg       Past Surgical History:   Procedure Laterality Date    HX PACEMAKER         Social History     Tobacco Use    Smoking status: Former Smoker     Types: Cigarettes     Quit date: 2/18/2018     Years since quitting: 3.1    Smokeless tobacco: Never Used   Substance Use Topics    Alcohol use: No       No Known Allergies    Outpatient Medications Marked as Taking for the 4/14/21 encounter (Office Visit) with Selam Lizama MD   Medication Sig Dispense Refill    sotaloL (BETAPACE) 80 mg tablet Take 1 Tab by mouth two (2) times a day. 60 Tab 4    albuterol (PROVENTIL HFA, VENTOLIN HFA, PROAIR HFA) 90 mcg/actuation inhaler Take 1 Puff by inhalation every four (4) hours as needed for Wheezing.  1 Inhaler 0        Visit Vitals  /72   Pulse (!) 57   Ht 5' 10\" (1.778 m)   BMI 27.26 kg/m²     Physical Exam   Constitutional: He is oriented to person, place, and time. He appears well-developed and well-nourished. No distress. HENT:   Head: Atraumatic. Mouth/Throat: No oropharyngeal exudate. Eyes: Conjunctivae are normal. Right eye exhibits no discharge. Left eye exhibits no discharge. No scleral icterus. Neck: Normal range of motion. Neck supple. No JVD present. No tracheal deviation present. No thyromegaly present. Cardiovascular: Normal rate and regular rhythm. Exam reveals no gallop. No murmur heard. Pulmonary/Chest: Effort normal and breath sounds normal. No stridor. He has no wheezes. He has no rales. ICD in the left chest wall   Abdominal: Soft. There is no abdominal tenderness. There is no rebound and no guarding. Musculoskeletal: Normal range of motion. General: No tenderness or edema. Lymphadenopathy:     He has no cervical adenopathy. Neurological: He is alert and oriented to person, place, and time. He exhibits normal muscle tone. Skin: Skin is warm. He is not diaphoretic. Psychiatric: He has a normal mood and affect. His behavior is normal.     ekg sinus rhythm with no acute st-t changes ( has mild RBBB pattern ? ??? brugada )  Echo 2016:  NORMAL LEFT VENTRICULAR CAVITY SIZE AND SYSTOLIC FUNCTION WITH AN EJECTION FRACTION OF 65%. NORMAL DIASTOLIC FILLING PATTERN. MILDLY DILATED RIGHT VENTRICULAR SIZE. MILD MITRAL REGURGITATION. NO EVIDENCE OF AORTIC REGURGITATION. TRACE OF TRICUSPID REGURGITATION. NORMAL PULMONARY ARTERY PRESSURE OF 17 MMHG. NO EVIDENCE OF PULMONIC REGURGITATION. NO PREVIOUS REPORT FOR COMPARISON.  01/20/20   ECHO ADULT COMPLETE 01/22/2020 1/22/2020    Narrative · Normal cavity size, wall thickness, systolic function (ejection fraction   normal) and diastolic function. Estimated left ventricular ejection   fraction is 55 - 60%. No regional wall motion abnormality noted.   · Normal right ventricular size and function. · No hemodynamically significant valvular pathology. Signed by: Clark Rodriguez MD     ASSESSMENT and PLAN    ICD-10-CM ICD-9-CM    1. Ventricular fibrillation (HCC)  I49.01 427.41    2. ICD (implantable cardioverter-defibrillator) in place  Z95.810 V45.02    3. Essential hypertension  I10 401.9    4. Dizziness  R42 780.4      Orders Placed This Encounter    sotaloL (BETAPACE) 80 mg tablet     Sig: Take 1 Tab by mouth two (2) times a day. Dispense:  60 Tab     Refill:  4     Follow-up and Dispositions    · Return in about 3 weeks (around 5/5/2021). current treatment plan is effective, no change in therapy. Patient with h/o Vfib s/p ICD -- had one episode of Vtach following implant-- was initially on amiodarone which was changed to sotalol 80mg bid. He unable to tolerate additional BB. Was recently admitted to [facility] after an episode of syncope- ICD interrogation revealed VTach which spontaneously resolved-- sotalol dose increased to 120 mg bid. C/o worsening dizziness and fatigue. Will decrease dose to 80 mg bid- if ventricular arrhythymias recur - will consider mexlitene. F/u in 3 weeks to reassess symptoms.

## 2021-05-05 ENCOUNTER — OFFICE VISIT (OUTPATIENT)
Dept: CARDIOLOGY CLINIC | Age: 36
End: 2021-05-05
Payer: MEDICARE

## 2021-05-05 VITALS
BODY MASS INDEX: 27.35 KG/M2 | DIASTOLIC BLOOD PRESSURE: 79 MMHG | WEIGHT: 191 LBS | HEART RATE: 65 BPM | SYSTOLIC BLOOD PRESSURE: 147 MMHG | HEIGHT: 70 IN

## 2021-05-05 DIAGNOSIS — R42 DIZZINESS: ICD-10-CM

## 2021-05-05 DIAGNOSIS — I49.01 VENTRICULAR FIBRILLATION (HCC): Primary | ICD-10-CM

## 2021-05-05 DIAGNOSIS — Z95.810 ICD (IMPLANTABLE CARDIOVERTER-DEFIBRILLATOR) IN PLACE: ICD-10-CM

## 2021-05-05 PROCEDURE — 99214 OFFICE O/P EST MOD 30 MIN: CPT | Performed by: INTERNAL MEDICINE

## 2021-05-05 PROCEDURE — G8754 DIAS BP LESS 90: HCPCS | Performed by: INTERNAL MEDICINE

## 2021-05-05 PROCEDURE — G8432 DEP SCR NOT DOC, RNG: HCPCS | Performed by: INTERNAL MEDICINE

## 2021-05-05 PROCEDURE — G8419 CALC BMI OUT NRM PARAM NOF/U: HCPCS | Performed by: INTERNAL MEDICINE

## 2021-05-05 PROCEDURE — G8428 CUR MEDS NOT DOCUMENT: HCPCS | Performed by: INTERNAL MEDICINE

## 2021-05-05 PROCEDURE — G8753 SYS BP > OR = 140: HCPCS | Performed by: INTERNAL MEDICINE

## 2021-05-05 RX ORDER — ALBUTEROL SULFATE 90 UG/1
1 AEROSOL, METERED RESPIRATORY (INHALATION)
Qty: 1 INHALER | Refills: 0 | Status: SHIPPED | OUTPATIENT
Start: 2021-05-05

## 2021-05-05 NOTE — PROGRESS NOTES
1. Have you been to the ER, urgent care clinic since your last visit? Hospitalized since your last visit?     no  2. Have you seen or consulted any other health care providers outside of the [address] [place] [address] since your last visit? Include any pap smears or colon screening.       No

## 2021-05-05 NOTE — PROGRESS NOTES
HISTORY OF PRESENT ILLNESS  Sean Vergara is a 39 y.o. male. Dizziness  The history is provided by the patient. This is a chronic problem. The problem occurs every several days. The problem has been gradually worsening. Palpitations   Associated symptoms include dizziness. Pertinent negatives include no diaphoresis, no malaise/fatigue, no nausea and no weakness. Review of Systems   Constitutional: Negative for chills, diaphoresis, malaise/fatigue and weight loss. HENT: Negative for congestion, ear discharge, hearing loss, nosebleeds and tinnitus. Eyes: Negative for blurred vision. Respiratory: Negative for stridor. Cardiovascular: Positive for palpitations. Gastrointestinal: Negative for heartburn and nausea. Musculoskeletal: Negative for myalgias. Skin: Negative for itching. Neurological: Positive for dizziness. Negative for tingling, tremors, focal weakness, loss of consciousness and weakness. Psychiatric/Behavioral: Negative for depression and suicidal ideas. Family History   Problem Relation Age of Onset    Heart Surgery Mother        Past Medical History:   Diagnosis Date    Chest pain 8/2/2018    atypical muscular or gerd     ICD (implantable cardioverter-defibrillator) in place     Palpitation 8/2/2018    recent compliant to sotalol qtc normal on er ekg       Past Surgical History:   Procedure Laterality Date    HX PACEMAKER         Social History     Tobacco Use    Smoking status: Former Smoker     Types: Cigarettes     Quit date: 2/18/2018     Years since quitting: 3.2    Smokeless tobacco: Never Used   Substance Use Topics    Alcohol use: No       No Known Allergies    Outpatient Medications Marked as Taking for the 5/5/21 encounter (Office Visit) with Chiara Ortez MD   Medication Sig Dispense Refill    albuterol (PROVENTIL HFA, VENTOLIN HFA, PROAIR HFA) 90 mcg/actuation inhaler Take 1 Puff by inhalation every four (4) hours as needed for Wheezing.  1 Inhaler 0    sotaloL (BETAPACE) 80 mg tablet Take 1 Tab by mouth two (2) times a day. 60 Tab 4        Visit Vitals  BP (!) 147/79   Pulse 65   Ht 5' 10\" (1.778 m)   Wt 86.6 kg (191 lb)   BMI 27.41 kg/m²     Physical Exam   Constitutional: He is oriented to person, place, and time. He appears well-developed and well-nourished. No distress. HENT:   Head: Atraumatic. Mouth/Throat: No oropharyngeal exudate. Eyes: Conjunctivae are normal. Right eye exhibits no discharge. Left eye exhibits no discharge. No scleral icterus. Neck: Normal range of motion. Neck supple. No JVD present. No tracheal deviation present. No thyromegaly present. Cardiovascular: Normal rate and regular rhythm. Exam reveals no gallop. No murmur heard. Pulmonary/Chest: Effort normal and breath sounds normal. No stridor. He has no wheezes. He has no rales. ICD in the left chest wall   Abdominal: Soft. There is no abdominal tenderness. There is no rebound and no guarding. Musculoskeletal: Normal range of motion. General: No tenderness or edema. Lymphadenopathy:     He has no cervical adenopathy. Neurological: He is alert and oriented to person, place, and time. He exhibits normal muscle tone. Skin: Skin is warm. He is not diaphoretic. Psychiatric: He has a normal mood and affect. His behavior is normal.     ekg sinus rhythm with no acute st-t changes ( has mild RBBB pattern ? ??? brugada )  Echo 2016:  NORMAL LEFT VENTRICULAR CAVITY SIZE AND SYSTOLIC FUNCTION WITH AN EJECTION FRACTION OF 65%. NORMAL DIASTOLIC FILLING PATTERN. MILDLY DILATED RIGHT VENTRICULAR SIZE. MILD MITRAL REGURGITATION. NO EVIDENCE OF AORTIC REGURGITATION. TRACE OF TRICUSPID REGURGITATION. NORMAL PULMONARY ARTERY PRESSURE OF 17 MMHG. NO EVIDENCE OF PULMONIC REGURGITATION.    NO PREVIOUS REPORT FOR COMPARISON.  01/20/20   ECHO ADULT COMPLETE 01/22/2020 1/22/2020    Narrative · Normal cavity size, wall thickness, systolic function (ejection fraction   normal) and diastolic function. Estimated left ventricular ejection   fraction is 55 - 60%. No regional wall motion abnormality noted. · Normal right ventricular size and function. · No hemodynamically significant valvular pathology. Signed by: Tan Mendieta MD     ASSESSMENT and PLAN    ICD-10-CM ICD-9-CM    1. Ventricular fibrillation (HCC)  I49.01 427.41    2. ICD (implantable cardioverter-defibrillator) in place  Z95.810 V45.02    3. Dizziness  R42 780.4    4. Essential hypertension  I10 401.9      Orders Placed This Encounter    albuterol (PROVENTIL HFA, VENTOLIN HFA, PROAIR HFA) 90 mcg/actuation inhaler     Sig: Take 1 Puff by inhalation every four (4) hours as needed for Wheezing. Dispense:  1 Inhaler     Refill:  0     Follow-up and Dispositions    · Return in about 6 months (around 11/5/2021). current treatment plan is effective, no change in therapy. Patient with h/o Vfib s/p ICD -- had one episode of Vtach following implant-- was initially on amiodarone which was changed to sotalol 80mg bid. He unable to tolerate additional BB. Was recently admitted to [place] [address] after an episode of syncope- ICD interrogation revealed VTach which spontaneously resolved-- sotalol dose increased to 120 mg bid. C/o worsening dizziness and fatigue. In the last appointment we decreased his sotalol dose to 80 mg twice daily. Symptoms of significantly improved. No ICD discharges. We will continue to monitor his device for any significant ventricular arrhythmias.   Follow-up in 6 months